# Patient Record
Sex: MALE | Race: WHITE | NOT HISPANIC OR LATINO
[De-identification: names, ages, dates, MRNs, and addresses within clinical notes are randomized per-mention and may not be internally consistent; named-entity substitution may affect disease eponyms.]

---

## 2022-07-15 ENCOUNTER — TRANSCRIPTION ENCOUNTER (OUTPATIENT)
Age: 67
End: 2022-07-15

## 2022-07-15 ENCOUNTER — INPATIENT (INPATIENT)
Facility: HOSPITAL | Age: 67
LOS: 5 days | Discharge: HOME | DRG: 326 | End: 2022-07-21
Attending: SURGERY | Admitting: SURGERY
Payer: MEDICAID

## 2022-07-15 VITALS
RESPIRATION RATE: 18 BRPM | HEART RATE: 103 BPM | WEIGHT: 190.04 LBS | TEMPERATURE: 97 F | SYSTOLIC BLOOD PRESSURE: 150 MMHG | DIASTOLIC BLOOD PRESSURE: 94 MMHG | HEIGHT: 75 IN | OXYGEN SATURATION: 97 %

## 2022-07-15 LAB
ALBUMIN SERPL ELPH-MCNC: 4.6 G/DL — SIGNIFICANT CHANGE UP (ref 3.3–5)
ALP SERPL-CCNC: 87 U/L — SIGNIFICANT CHANGE UP (ref 40–120)
ALT FLD-CCNC: 20 U/L — SIGNIFICANT CHANGE UP (ref 10–45)
ANION GAP SERPL CALC-SCNC: 15 MMOL/L — SIGNIFICANT CHANGE UP (ref 5–17)
AST SERPL-CCNC: 14 U/L — SIGNIFICANT CHANGE UP (ref 10–40)
BILIRUB SERPL-MCNC: 1.1 MG/DL — SIGNIFICANT CHANGE UP (ref 0.2–1.2)
BUN SERPL-MCNC: 16 MG/DL — SIGNIFICANT CHANGE UP (ref 7–23)
CALCIUM SERPL-MCNC: 9.1 MG/DL — SIGNIFICANT CHANGE UP (ref 8.4–10.5)
CHLORIDE SERPL-SCNC: 105 MMOL/L — SIGNIFICANT CHANGE UP (ref 96–108)
CO2 SERPL-SCNC: 21 MMOL/L — LOW (ref 22–31)
CREAT SERPL-MCNC: 1.02 MG/DL — SIGNIFICANT CHANGE UP (ref 0.5–1.3)
EGFR: 82 ML/MIN/1.73M2 — SIGNIFICANT CHANGE UP
GLUCOSE SERPL-MCNC: 177 MG/DL — HIGH (ref 70–99)
HCT VFR BLD CALC: 47.4 % — SIGNIFICANT CHANGE UP (ref 39–50)
HGB BLD-MCNC: 16.4 G/DL — SIGNIFICANT CHANGE UP (ref 13–17)
MAGNESIUM SERPL-MCNC: 1.9 MG/DL — SIGNIFICANT CHANGE UP (ref 1.6–2.6)
MCHC RBC-ENTMCNC: 32.9 PG — SIGNIFICANT CHANGE UP (ref 27–34)
MCHC RBC-ENTMCNC: 34.6 GM/DL — SIGNIFICANT CHANGE UP (ref 32–36)
MCV RBC AUTO: 95 FL — SIGNIFICANT CHANGE UP (ref 80–100)
PHOSPHATE SERPL-MCNC: 2.9 MG/DL — SIGNIFICANT CHANGE UP (ref 2.5–4.5)
PLATELET # BLD AUTO: 208 K/UL — SIGNIFICANT CHANGE UP (ref 150–400)
POTASSIUM SERPL-MCNC: 4 MMOL/L — SIGNIFICANT CHANGE UP (ref 3.5–5.3)
POTASSIUM SERPL-SCNC: 4 MMOL/L — SIGNIFICANT CHANGE UP (ref 3.5–5.3)
PROT SERPL-MCNC: 7.2 G/DL — SIGNIFICANT CHANGE UP (ref 6–8.3)
RBC # BLD: 4.99 M/UL — SIGNIFICANT CHANGE UP (ref 4.2–5.8)
RBC # FLD: 12.2 % — SIGNIFICANT CHANGE UP (ref 10.3–14.5)
SODIUM SERPL-SCNC: 141 MMOL/L — SIGNIFICANT CHANGE UP (ref 135–145)
WBC # BLD: 17.7 K/UL — HIGH (ref 3.8–10.5)
WBC # FLD AUTO: 17.7 K/UL — HIGH (ref 3.8–10.5)

## 2022-07-15 PROCEDURE — 93010 ELECTROCARDIOGRAM REPORT: CPT

## 2022-07-15 PROCEDURE — 71045 X-RAY EXAM CHEST 1 VIEW: CPT | Mod: 26

## 2022-07-15 PROCEDURE — 74177 CT ABD & PELVIS W/CONTRAST: CPT | Mod: 26,MA

## 2022-07-15 PROCEDURE — 99291 CRITICAL CARE FIRST HOUR: CPT

## 2022-07-15 RX ORDER — MORPHINE SULFATE 50 MG/1
4 CAPSULE, EXTENDED RELEASE ORAL ONCE
Refills: 0 | Status: DISCONTINUED | OUTPATIENT
Start: 2022-07-15 | End: 2022-07-15

## 2022-07-15 RX ORDER — PIPERACILLIN AND TAZOBACTAM 4; .5 G/20ML; G/20ML
3.38 INJECTION, POWDER, LYOPHILIZED, FOR SOLUTION INTRAVENOUS ONCE
Refills: 0 | Status: COMPLETED | OUTPATIENT
Start: 2022-07-15 | End: 2022-07-15

## 2022-07-15 RX ADMIN — MORPHINE SULFATE 4 MILLIGRAM(S): 50 CAPSULE, EXTENDED RELEASE ORAL at 22:57

## 2022-07-15 NOTE — ED PROVIDER NOTE - CARE PLAN
Principal Discharge DX:	Acute abdomen   1 Principal Discharge DX:	Acute abdomen  Secondary Diagnosis:	Perforated abdominal viscus

## 2022-07-15 NOTE — ED ADULT NURSE NOTE - OBJECTIVE STATEMENT
67 YO male with no stated PMH, via EMS from home, presenting with complaints of abdominal pain. As per patient, pt has been having abdominal pain for the last 6 months, which had worsened over the last few weeks. Pt states he has 7/10 abdominal pain, described as sharpness, which is worse to the right side and periumbilical region, which radiates to the shoulders, and worsens with movement, positional changes, deep breathing, and palpation. Pt states he took Tylenol with minimal relief. Pt denies chest pain, palpitations, shortness of breath, headache, visual disturbances, numbness/tingling, fever, chills, diaphoresis,  vomiting, constipation, diarrhea, or urinary symptoms. Pt reports last BM "yesterday" described as normal.

## 2022-07-15 NOTE — ED PROVIDER NOTE - OBJECTIVE STATEMENT
The patient is a 66y Male with 30+ pack per year smoker complaining of abdominal pain x 6 months which has worsened over the last two weeks. He describes the pain as a diffuse sharp pain which is predominantly worse in the RLQ and umbilical area. Pain radiates to the B/L scapula and is worse with movement, belching, and deep breaths. He has taken 2 Tylenol today with minimal relief. He denies any fevers, chills, nausea/vomiting, diarrhea, constipation, hematochezia, melena, SOB, CP, palpitations. Of note patient is a long time drinker and drinks as much as 2+ shots of alcohol per day. LBM today was normal. The patient is a 66y Male with 30+ pack per year smoker complaining of abdominal pain x 6 months which has worsened over the last two weeks. He describes the pain as a diffuse sharp pain which is predominantly worse in the lower abdoman but is now more generalized Pain radiates to the B/L scapula and is worse with movement, belching, and deep breaths. He has taken 2 Tylenol today with minimal relief. He denies any fevers, chills, nausea/vomiting, diarrhea, constipation, hematochezia, melena, SOB, CP, palpitations. Of note patient is a long time drinker and drinks as much as 2+ shots of alcohol per day. LBM today was normal. +tobacco.

## 2022-07-15 NOTE — ED PROVIDER NOTE - CLINICAL SUMMARY MEDICAL DECISION MAKING FREE TEXT BOX
The patient is a 66y Male with 30+ pack per year smoker complaining of abdominal pain x 6 months which has worsened over the last two weeks. Not currently with acute abdomen and hemodynamically stable but concerning presentation with periumbilical/RLQ tenderness. Differential includes appendicitis, cholecystitis, pancreatitis, GERD, gastritis. R/O AAA given history of smoking. Urgent CTAP w/IV contrast ordered. Labs pending. EKG pending and CXR sent. Morphine for pain. The patient is a 66y Male with 30+ pack per year smoker, daily drinker, does not see doctors, complaining of abdominal pain x 6 months which has worsened over the last two weeks. Hemodynamically stable which dec likelihood of vasc emergency, however +guarding, diffuse TTP, referred pain to BL shoulders increasing likelihood for perf viscus. Check upright CXR. Urgent CTAP w/IV contrast ordered. Labs, EKG pending, Morphine for pain. Likely surgical c/s once CXR obtained.

## 2022-07-16 DIAGNOSIS — R10.0 ACUTE ABDOMEN: ICD-10-CM

## 2022-07-16 LAB
ALBUMIN SERPL ELPH-MCNC: 3.7 G/DL — SIGNIFICANT CHANGE UP (ref 3.3–5)
ALP SERPL-CCNC: 61 U/L — SIGNIFICANT CHANGE UP (ref 40–120)
ALT FLD-CCNC: 18 U/L — SIGNIFICANT CHANGE UP (ref 10–45)
ANION GAP SERPL CALC-SCNC: 13 MMOL/L — SIGNIFICANT CHANGE UP (ref 5–17)
APTT BLD: 30.7 SEC — SIGNIFICANT CHANGE UP (ref 27.5–35.5)
AST SERPL-CCNC: 18 U/L — SIGNIFICANT CHANGE UP (ref 10–40)
BASE EXCESS BLDV CALC-SCNC: -3 MMOL/L — LOW (ref -2–2)
BASOPHILS # BLD AUTO: 0 K/UL — SIGNIFICANT CHANGE UP (ref 0–0.2)
BASOPHILS NFR BLD AUTO: 0 % — SIGNIFICANT CHANGE UP (ref 0–2)
BILIRUB DIRECT SERPL-MCNC: 0.3 MG/DL — SIGNIFICANT CHANGE UP (ref 0–0.3)
BILIRUB INDIRECT FLD-MCNC: 1.3 MG/DL — HIGH (ref 0.2–1)
BILIRUB SERPL-MCNC: 1.6 MG/DL — HIGH (ref 0.2–1.2)
BLD GP AB SCN SERPL QL: NEGATIVE — SIGNIFICANT CHANGE UP
BLOOD GAS VENOUS - CREATININE: SIGNIFICANT CHANGE UP MG/DL (ref 0.5–1.3)
BUN SERPL-MCNC: 14 MG/DL — SIGNIFICANT CHANGE UP (ref 7–23)
CA-I SERPL-SCNC: 1.11 MMOL/L — LOW (ref 1.15–1.33)
CALCIUM SERPL-MCNC: 8.8 MG/DL — SIGNIFICANT CHANGE UP (ref 8.4–10.5)
CHLORIDE BLDV-SCNC: 103 MMOL/L — SIGNIFICANT CHANGE UP (ref 96–108)
CHLORIDE SERPL-SCNC: 105 MMOL/L — SIGNIFICANT CHANGE UP (ref 96–108)
CO2 BLDV-SCNC: 24 MMOL/L — SIGNIFICANT CHANGE UP (ref 22–26)
CO2 SERPL-SCNC: 21 MMOL/L — LOW (ref 22–31)
CREAT SERPL-MCNC: 0.95 MG/DL — SIGNIFICANT CHANGE UP (ref 0.5–1.3)
EGFR: 89 ML/MIN/1.73M2 — SIGNIFICANT CHANGE UP
EOSINOPHIL # BLD AUTO: 0 K/UL — SIGNIFICANT CHANGE UP (ref 0–0.5)
EOSINOPHIL NFR BLD AUTO: 0 % — SIGNIFICANT CHANGE UP (ref 0–6)
GAS PNL BLDA: SIGNIFICANT CHANGE UP
GAS PNL BLDA: SIGNIFICANT CHANGE UP
GAS PNL BLDV: 136 MMOL/L — SIGNIFICANT CHANGE UP (ref 136–145)
GAS PNL BLDV: SIGNIFICANT CHANGE UP
GAS PNL BLDV: SIGNIFICANT CHANGE UP
GLUCOSE BLDV-MCNC: 172 MG/DL — HIGH (ref 70–99)
GLUCOSE SERPL-MCNC: 191 MG/DL — HIGH (ref 70–99)
HCO3 BLDV-SCNC: 23 MMOL/L — SIGNIFICANT CHANGE UP (ref 22–29)
HCT VFR BLD CALC: 43.1 % — SIGNIFICANT CHANGE UP (ref 39–50)
HCT VFR BLDA CALC: 47 % — SIGNIFICANT CHANGE UP (ref 39–51)
HGB BLD CALC-MCNC: 15.6 G/DL — SIGNIFICANT CHANGE UP (ref 12.6–17.4)
HGB BLD-MCNC: 14.8 G/DL — SIGNIFICANT CHANGE UP (ref 13–17)
INR BLD: 1.08 RATIO — SIGNIFICANT CHANGE UP (ref 0.88–1.16)
LACTATE BLDV-MCNC: 4 MMOL/L — CRITICAL HIGH (ref 0.7–2)
LYMPHOCYTES # BLD AUTO: 0.35 K/UL — LOW (ref 1–3.3)
LYMPHOCYTES # BLD AUTO: 2 % — LOW (ref 13–44)
MAGNESIUM SERPL-MCNC: 2 MG/DL — SIGNIFICANT CHANGE UP (ref 1.6–2.6)
MANUAL SMEAR VERIFICATION: SIGNIFICANT CHANGE UP
MCHC RBC-ENTMCNC: 32.7 PG — SIGNIFICANT CHANGE UP (ref 27–34)
MCHC RBC-ENTMCNC: 34.3 GM/DL — SIGNIFICANT CHANGE UP (ref 32–36)
MCV RBC AUTO: 95.4 FL — SIGNIFICANT CHANGE UP (ref 80–100)
METAMYELOCYTES # FLD: 1 % — HIGH (ref 0–0)
MONOCYTES # BLD AUTO: 0.18 K/UL — SIGNIFICANT CHANGE UP (ref 0–0.9)
MONOCYTES NFR BLD AUTO: 1 % — LOW (ref 2–14)
NEUTROPHILS # BLD AUTO: 16.99 K/UL — HIGH (ref 1.8–7.4)
NEUTROPHILS NFR BLD AUTO: 89.9 % — HIGH (ref 43–77)
NEUTS BAND # BLD: 6.1 % — SIGNIFICANT CHANGE UP (ref 0–8)
NRBC # BLD: 0 /100 WBCS — SIGNIFICANT CHANGE UP (ref 0–0)
PCO2 BLDV: 42 MMHG — SIGNIFICANT CHANGE UP (ref 42–55)
PH BLDV: 7.34 — SIGNIFICANT CHANGE UP (ref 7.32–7.43)
PHOSPHATE SERPL-MCNC: 3.6 MG/DL — SIGNIFICANT CHANGE UP (ref 2.5–4.5)
PLAT MORPH BLD: NORMAL — SIGNIFICANT CHANGE UP
PLATELET # BLD AUTO: 185 K/UL — SIGNIFICANT CHANGE UP (ref 150–400)
PO2 BLDV: 37 MMHG — SIGNIFICANT CHANGE UP (ref 25–45)
POTASSIUM BLDV-SCNC: 4.6 MMOL/L — SIGNIFICANT CHANGE UP (ref 3.5–5.1)
POTASSIUM SERPL-MCNC: 4.2 MMOL/L — SIGNIFICANT CHANGE UP (ref 3.5–5.3)
POTASSIUM SERPL-SCNC: 4.2 MMOL/L — SIGNIFICANT CHANGE UP (ref 3.5–5.3)
PROT SERPL-MCNC: 6 G/DL — SIGNIFICANT CHANGE UP (ref 6–8.3)
PROTHROM AB SERPL-ACNC: 12.5 SEC — SIGNIFICANT CHANGE UP (ref 10.5–13.4)
RBC # BLD: 4.52 M/UL — SIGNIFICANT CHANGE UP (ref 4.2–5.8)
RBC # FLD: 12.3 % — SIGNIFICANT CHANGE UP (ref 10.3–14.5)
RBC BLD AUTO: NORMAL — SIGNIFICANT CHANGE UP
RH IG SCN BLD-IMP: NEGATIVE — SIGNIFICANT CHANGE UP
SAO2 % BLDV: 58.7 % — LOW (ref 67–88)
SARS-COV-2 RNA SPEC QL NAA+PROBE: SIGNIFICANT CHANGE UP
SODIUM SERPL-SCNC: 139 MMOL/L — SIGNIFICANT CHANGE UP (ref 135–145)
WBC # BLD: 14.03 K/UL — HIGH (ref 3.8–10.5)
WBC # FLD AUTO: 14.03 K/UL — HIGH (ref 3.8–10.5)

## 2022-07-16 PROCEDURE — 43840 GSTRRPHY SUTR DUOL/GSTR ULCR: CPT

## 2022-07-16 PROCEDURE — 71045 X-RAY EXAM CHEST 1 VIEW: CPT | Mod: 26,77

## 2022-07-16 PROCEDURE — 71045 X-RAY EXAM CHEST 1 VIEW: CPT | Mod: 26

## 2022-07-16 PROCEDURE — 49905 OMENTAL FLAP INTRA-ABDOM: CPT

## 2022-07-16 PROCEDURE — 93010 ELECTROCARDIOGRAM REPORT: CPT

## 2022-07-16 PROCEDURE — 99222 1ST HOSP IP/OBS MODERATE 55: CPT | Mod: 57

## 2022-07-16 RX ORDER — ACETAMINOPHEN 500 MG
1000 TABLET ORAL ONCE
Refills: 0 | Status: COMPLETED | OUTPATIENT
Start: 2022-07-16 | End: 2022-07-16

## 2022-07-16 RX ORDER — PANTOPRAZOLE SODIUM 20 MG/1
40 TABLET, DELAYED RELEASE ORAL ONCE
Refills: 0 | Status: DISCONTINUED | OUTPATIENT
Start: 2022-07-16 | End: 2022-07-16

## 2022-07-16 RX ORDER — SODIUM CHLORIDE 9 MG/ML
1000 INJECTION, SOLUTION INTRAVENOUS
Refills: 0 | Status: DISCONTINUED | OUTPATIENT
Start: 2022-07-16 | End: 2022-07-16

## 2022-07-16 RX ORDER — NICOTINE POLACRILEX 2 MG
1 GUM BUCCAL DAILY
Refills: 0 | Status: DISCONTINUED | OUTPATIENT
Start: 2022-07-16 | End: 2022-07-21

## 2022-07-16 RX ORDER — PIPERACILLIN AND TAZOBACTAM 4; .5 G/20ML; G/20ML
3.38 INJECTION, POWDER, LYOPHILIZED, FOR SOLUTION INTRAVENOUS EVERY 8 HOURS
Refills: 0 | Status: COMPLETED | OUTPATIENT
Start: 2022-07-16 | End: 2022-07-19

## 2022-07-16 RX ORDER — FLUCONAZOLE 150 MG/1
400 TABLET ORAL EVERY 24 HOURS
Refills: 0 | Status: DISCONTINUED | OUTPATIENT
Start: 2022-07-16 | End: 2022-07-17

## 2022-07-16 RX ORDER — DIAZEPAM 5 MG
20 TABLET ORAL
Refills: 0 | Status: DISCONTINUED | OUTPATIENT
Start: 2022-07-16 | End: 2022-07-21

## 2022-07-16 RX ORDER — FENTANYL CITRATE 50 UG/ML
25 INJECTION INTRAVENOUS
Refills: 0 | Status: DISCONTINUED | OUTPATIENT
Start: 2022-07-16 | End: 2022-07-16

## 2022-07-16 RX ORDER — SODIUM CHLORIDE 9 MG/ML
1000 INJECTION, SOLUTION INTRAVENOUS
Refills: 0 | Status: DISCONTINUED | OUTPATIENT
Start: 2022-07-16 | End: 2022-07-17

## 2022-07-16 RX ORDER — MORPHINE SULFATE 50 MG/1
4 CAPSULE, EXTENDED RELEASE ORAL ONCE
Refills: 0 | Status: DISCONTINUED | OUTPATIENT
Start: 2022-07-16 | End: 2022-07-16

## 2022-07-16 RX ORDER — SODIUM CHLORIDE 9 MG/ML
1000 INJECTION INTRAMUSCULAR; INTRAVENOUS; SUBCUTANEOUS ONCE
Refills: 0 | Status: COMPLETED | OUTPATIENT
Start: 2022-07-16 | End: 2022-07-16

## 2022-07-16 RX ORDER — PANTOPRAZOLE SODIUM 20 MG/1
40 TABLET, DELAYED RELEASE ORAL EVERY 12 HOURS
Refills: 0 | Status: DISCONTINUED | OUTPATIENT
Start: 2022-07-16 | End: 2022-07-16

## 2022-07-16 RX ORDER — NICOTINE POLACRILEX 2 MG
1 GUM BUCCAL DAILY
Refills: 0 | Status: DISCONTINUED | OUTPATIENT
Start: 2022-07-16 | End: 2022-07-16

## 2022-07-16 RX ORDER — KETOROLAC TROMETHAMINE 30 MG/ML
15 SYRINGE (ML) INJECTION ONCE
Refills: 0 | Status: DISCONTINUED | OUTPATIENT
Start: 2022-07-16 | End: 2022-07-16

## 2022-07-16 RX ORDER — ENOXAPARIN SODIUM 100 MG/ML
40 INJECTION SUBCUTANEOUS EVERY 24 HOURS
Refills: 0 | Status: DISCONTINUED | OUTPATIENT
Start: 2022-07-16 | End: 2022-07-16

## 2022-07-16 RX ORDER — FENTANYL CITRATE 50 UG/ML
50 INJECTION INTRAVENOUS ONCE
Refills: 0 | Status: DISCONTINUED | OUTPATIENT
Start: 2022-07-16 | End: 2022-07-16

## 2022-07-16 RX ORDER — ENOXAPARIN SODIUM 100 MG/ML
40 INJECTION SUBCUTANEOUS EVERY 24 HOURS
Refills: 0 | Status: DISCONTINUED | OUTPATIENT
Start: 2022-07-16 | End: 2022-07-21

## 2022-07-16 RX ADMIN — Medication 400 MILLIGRAM(S): at 12:44

## 2022-07-16 RX ADMIN — SODIUM CHLORIDE 150 MILLILITER(S): 9 INJECTION, SOLUTION INTRAVENOUS at 21:50

## 2022-07-16 RX ADMIN — PIPERACILLIN AND TAZOBACTAM 25 GRAM(S): 4; .5 INJECTION, POWDER, LYOPHILIZED, FOR SOLUTION INTRAVENOUS at 12:44

## 2022-07-16 RX ADMIN — PIPERACILLIN AND TAZOBACTAM 200 GRAM(S): 4; .5 INJECTION, POWDER, LYOPHILIZED, FOR SOLUTION INTRAVENOUS at 00:46

## 2022-07-16 RX ADMIN — FLUCONAZOLE 100 MILLIGRAM(S): 150 TABLET ORAL at 05:58

## 2022-07-16 RX ADMIN — MORPHINE SULFATE 4 MILLIGRAM(S): 50 CAPSULE, EXTENDED RELEASE ORAL at 00:45

## 2022-07-16 RX ADMIN — PIPERACILLIN AND TAZOBACTAM 25 GRAM(S): 4; .5 INJECTION, POWDER, LYOPHILIZED, FOR SOLUTION INTRAVENOUS at 05:39

## 2022-07-16 RX ADMIN — Medication 15 MILLIGRAM(S): at 18:43

## 2022-07-16 RX ADMIN — Medication 15 MILLIGRAM(S): at 18:47

## 2022-07-16 RX ADMIN — ENOXAPARIN SODIUM 40 MILLIGRAM(S): 100 INJECTION SUBCUTANEOUS at 05:40

## 2022-07-16 RX ADMIN — Medication 1000 MILLIGRAM(S): at 17:56

## 2022-07-16 RX ADMIN — SODIUM CHLORIDE 150 MILLILITER(S): 9 INJECTION, SOLUTION INTRAVENOUS at 05:39

## 2022-07-16 RX ADMIN — PIPERACILLIN AND TAZOBACTAM 25 GRAM(S): 4; .5 INJECTION, POWDER, LYOPHILIZED, FOR SOLUTION INTRAVENOUS at 21:50

## 2022-07-16 NOTE — BRIEF OPERATIVE NOTE - NSICDXBRIEFPROCEDURE_GEN_ALL_CORE_FT
PROCEDURES:  Exploratory laparotomy 16-Jul-2022 04:41:02  Rodriguez CORRIGAN  Repair of perforated pyloric ulcer using Chilo patch 16-Jul-2022 04:41:13  Rodriguez CORRIGAN

## 2022-07-16 NOTE — PROGRESS NOTE ADULT - SUBJECTIVE AND OBJECTIVE BOX
GENERAL SURGERY PROGRESS NOTE    doing well, had NGT coiled in mouth this morning, reinserted  no bowel function  pain well controlled    10-point review of systems completed and negative except as noted above.      OBJECTIVE    MEDICATIONS  enoxaparin Injectable 40 milliGRAM(s) SubCutaneous every 24 hours  fluconAZOLE IVPB 400 milliGRAM(s) IV Intermittent every 24 hours  lactated ringers. 1000 milliLiter(s) IV Continuous <Continuous>  LORazepam   Injectable 2 milliGRAM(s) IV Push every 1 hour PRN  nicotine -  14 mG/24Hr(s) Patch 1 Patch Transdermal daily  piperacillin/tazobactam IVPB.. 3.375 Gram(s) IV Intermittent every 8 hours      PHYSICAL EXAM  T(C): 37.1 (07-16-22 @ 14:52), Max: 37.4 (07-16-22 @ 11:20)  HR: 67 (07-16-22 @ 14:52) (66 - 103)  BP: 95/59 (07-16-22 @ 14:52) (93/61 - 151/91)  RR: 18 (07-16-22 @ 14:52) (12 - 20)  SpO2: 96% (07-16-22 @ 14:52) (94% - 100%)    07-15-22 @ 07:01  -  07-16-22 @ 07:00  --------------------------------------------------------  IN: 675 mL / OUT: 425 mL / NET: 250 mL    07-16-22 @ 07:01  -  07-16-22 @ 14:53  --------------------------------------------------------  IN: 450 mL / OUT: 230 mL / NET: 220 mL        General: Appears well, NAD  Neuro: AAOx3  CHEST: Clear to auscultation bilaterally  CV: Regular rate and rhythm  Abdomen: soft, incisional tenderness, nondistended, no rebound or guarding, ARIADNA serosang  Extremities: Grossly symmetric    LABS                        14.8   14.03 )-----------( 185      ( 16 Jul 2022 05:59 )             43.1     07-16    139  |  105  |  14  ----------------------------<  191<H>  4.2   |  21<L>  |  0.95    Ca    8.8      16 Jul 2022 05:58  Phos  3.6     07-16  Mg     2.0     07-16    TPro  6.0  /  Alb  3.7  /  TBili  1.6<H>  /  DBili  0.3  /  AST  18  /  ALT  18  /  AlkPhos  61  07-16    PT/INR - ( 16 Jul 2022 01:15 )   PT: 12.5 sec;   INR: 1.08 ratio         PTT - ( 16 Jul 2022 01:15 )  PTT:30.7 sec      RADIOLOGY & ADDITIONAL STUDIES

## 2022-07-16 NOTE — H&P ADULT - NSHPLABSRESULTS_GEN_ALL_CORE
T(C): 37.3 (07-15-22 @ 22:55), Max: 37.3 (07-15-22 @ 22:55)  HR: 90 (07-15-22 @ 22:55) (90 - 103)  BP: 139/94 (07-15-22 @ 22:55) (139/94 - 150/94)  RR: 20 (07-15-22 @ 22:55) (18 - 20)  SpO2: 97% (07-15-22 @ 22:55) (97% - 97%)                              16.4   17.70 )-----------( 208      ( 15 Jul 2022 23:20 )             47.4   07-15    141  |  105  |  16  ----------------------------<  177<H>  4.0   |  21<L>  |  1.02    Ca    9.1      15 Jul 2022 23:20  Phos  2.9     07-15  Mg     1.9     07-15    TPro  7.2  /  Alb  4.6  /  TBili  1.1  /  DBili  x   /  AST  14  /  ALT  20  /  AlkPhos  87  07-15      CT: moderate free air.

## 2022-07-16 NOTE — ED ADULT NURSE REASSESSMENT NOTE - NS ED NURSE REASSESS COMMENT FT1
Pt to go to OR. Report called. Pt clothing removed and placed in OR cabinet. Pt's valuables placed in valuables envelope [id number].

## 2022-07-16 NOTE — PATIENT PROFILE ADULT - NSPROEXTENSIONSOFSELF_GEN_A_NUR
----- Message from [facility], RT sent at 1/6/2020 10:41 AM CST -----  Regarding: Lab due  Luis Barboza MD on 1/4/2020 at 3:38 PM CST  Call patient.  Blood sugar was 180, A1c was fine at 6.6, kidney function are normal liver enzymes are slightly elevated, cholesterol looks good at 181 triglycerides slightly elevated  256, is time to reduce the carbs in bread in your diet.  Reduce potatoes rice and pasta.  Recheck A1c and CMP in 3 months.   none

## 2022-07-16 NOTE — H&P ADULT - HISTORY OF PRESENT ILLNESS
66 year male with history of daily ETOH use who presents with one day of acute severe abdominal pain. Patient reports he was eating this afternoon when he had severe pain associated with nausea. Denies fever, chills, vomiting. Have not seen a doctor in a long time. Report drinks 3 shots of vodka every night.

## 2022-07-16 NOTE — PATIENT PROFILE ADULT - FALL HARM RISK - HARM RISK INTERVENTIONS
Assistance with ambulation/Assistance OOB with selected safe patient handling equipment/Communicate Risk of Fall with Harm to all staff/Monitor for mental status changes/Monitor gait and stability/Reinforce activity limits and safety measures with patient and family/Tailored Fall Risk Interventions/Toileting schedule using arm’s reach rule for commode and bathroom/Use of alarms - bed, chair and/or voice tab/Visual Cue: Yellow wristband and red socks/Bed in lowest position, wheels locked, appropriate side rails in place/Call bell, personal items and telephone in reach/Instruct patient to call for assistance before getting out of bed or chair/Non-slip footwear when patient is out of bed/[place] to call system/Physically safe environment - no spills, clutter or unnecessary equipment/Purposeful Proactive Rounding/Room/bathroom lighting operational, light cord in reach

## 2022-07-16 NOTE — H&P ADULT - NSHPSOCIALHISTORY_GEN_ALL_CORE
smokes 3 ppd, and 3 vodka shots  lives with his disabled friend who would be his next of kin should he need decisions made on his behalf.

## 2022-07-16 NOTE — PROGRESS NOTE ADULT - ASSESSMENT
66 year male with history of daily ETOH use who presents with peritonitis and found to have moderate amount of intraabdominal free air concern for perforated viscus. s/p open ayanna patch repair of pre-pyloric perforated ulcer     Plan  NPO NGT to lws  IFVFs, pain control  H. pylori testing  IV abx  ARIADNA to bulb suction  f/u cxr s/p NGT repositioning

## 2022-07-16 NOTE — H&P ADULT - NSHPPHYSICALEXAM_GEN_ALL_CORE
General: well developed, well nourished, In distress. Diaphoretic.   Neuro: alert and oriented, no focal deficits, moves all extremities spontaneously  HEENT: NCAT, no lymphadenopathy  Respiratory: airway patent, respirations unlabored  CVS: regular rate and rhythm  Abdomen: Distended, diffuse tenderness with guarding.   Extremities: no edema, sensation and movement grossly intact  Skin: warm, dry, appropriate color

## 2022-07-16 NOTE — PRE-ANESTHESIA EVALUATION ADULT - NSANTHPMHFT_GEN_ALL_CORE
66 year male reports no PMHx, smoker, daily ETOH presented  peritonitis and found to have moderate amount of intraabdominal free air concern for perforated viscus.  METS>4 Endorses CP months ago, occasional SOB.

## 2022-07-16 NOTE — H&P ADULT - ATTENDING COMMENTS
Patient seen and examined preoperatively  Chart, labs, and imaging reviewed  Signs / symptoms / imaging consistent with acute perforated viscous  Risks / benefits / alternatives to immediate OR for exploratory laparotomy discussed with patient & consent signed

## 2022-07-16 NOTE — H&P ADULT - ASSESSMENT
66 year male with history of daily ETOH use who presents with peritonitis and found to have moderate amount of intraabdominal free air concern for perforated viscus.     Plan  - Admit to surgery under Dr. Pa  - Consented and booked emergently for the OR  - NPO/IVF  - CIWA for history of Etoh and current diaphoresis  - Pain control  - Lovenox for dvt ppx    Discussed with Attending Dr. Ember Aragon MD, PGY 4  Trauma and Acute Care Surgery  [id number]

## 2022-07-16 NOTE — PATIENT PROFILE ADULT - NSPROPTRIGHTSUPPORTPERSON_GEN_A_NUR
declines Consent: The patient's consent was obtained including but not limited to risks of crusting, scabbing, blistering, scarring, darker or lighter pigmentary change, recurrence, incomplete removal and infection. Number Of Freeze-Thaw Cycles: 3 freeze-thaw cycles Duration Of Freeze Thaw-Cycle (Seconds): 10 Post-Care Instructions: I reviewed with the patient in detail post-care instructions. Patient is to wear sunprotection, and avoid picking at any of the treated lesions. Pt may apply Vaseline to crusted or scabbing areas. Detail Level: Detailed Render Post-Care Instructions In Note?: yes Application Tool (Optional): Liquid Nitrogen Sprayer Render Note In Bullet Format When Appropriate: No

## 2022-07-17 LAB
ALBUMIN SERPL ELPH-MCNC: 3.2 G/DL — LOW (ref 3.3–5)
ALP SERPL-CCNC: 60 U/L — SIGNIFICANT CHANGE UP (ref 40–120)
ALT FLD-CCNC: 13 U/L — SIGNIFICANT CHANGE UP (ref 10–45)
ANION GAP SERPL CALC-SCNC: 11 MMOL/L — SIGNIFICANT CHANGE UP (ref 5–17)
AST SERPL-CCNC: 17 U/L — SIGNIFICANT CHANGE UP (ref 10–40)
BILIRUB SERPL-MCNC: 1.5 MG/DL — HIGH (ref 0.2–1.2)
BUN SERPL-MCNC: 14 MG/DL — SIGNIFICANT CHANGE UP (ref 7–23)
CALCIUM SERPL-MCNC: 8.4 MG/DL — SIGNIFICANT CHANGE UP (ref 8.4–10.5)
CHLORIDE SERPL-SCNC: 106 MMOL/L — SIGNIFICANT CHANGE UP (ref 96–108)
CO2 SERPL-SCNC: 24 MMOL/L — SIGNIFICANT CHANGE UP (ref 22–31)
CREAT SERPL-MCNC: 1.26 MG/DL — SIGNIFICANT CHANGE UP (ref 0.5–1.3)
EGFR: 64 ML/MIN/1.73M2 — SIGNIFICANT CHANGE UP
GLUCOSE SERPL-MCNC: 116 MG/DL — HIGH (ref 70–99)
HCT VFR BLD CALC: 39 % — SIGNIFICANT CHANGE UP (ref 39–50)
HGB BLD-MCNC: 13 G/DL — SIGNIFICANT CHANGE UP (ref 13–17)
MAGNESIUM SERPL-MCNC: 1.9 MG/DL — SIGNIFICANT CHANGE UP (ref 1.6–2.6)
MCHC RBC-ENTMCNC: 32.2 PG — SIGNIFICANT CHANGE UP (ref 27–34)
MCHC RBC-ENTMCNC: 33.3 GM/DL — SIGNIFICANT CHANGE UP (ref 32–36)
MCV RBC AUTO: 96.5 FL — SIGNIFICANT CHANGE UP (ref 80–100)
NRBC # BLD: 0 /100 WBCS — SIGNIFICANT CHANGE UP (ref 0–0)
PHOSPHATE SERPL-MCNC: 2 MG/DL — LOW (ref 2.5–4.5)
PLATELET # BLD AUTO: 168 K/UL — SIGNIFICANT CHANGE UP (ref 150–400)
POTASSIUM SERPL-MCNC: 3.7 MMOL/L — SIGNIFICANT CHANGE UP (ref 3.5–5.3)
POTASSIUM SERPL-SCNC: 3.7 MMOL/L — SIGNIFICANT CHANGE UP (ref 3.5–5.3)
PROT SERPL-MCNC: 5.9 G/DL — LOW (ref 6–8.3)
RBC # BLD: 4.04 M/UL — LOW (ref 4.2–5.8)
RBC # FLD: 12.9 % — SIGNIFICANT CHANGE UP (ref 10.3–14.5)
SODIUM SERPL-SCNC: 141 MMOL/L — SIGNIFICANT CHANGE UP (ref 135–145)
WBC # BLD: 11.57 K/UL — HIGH (ref 3.8–10.5)
WBC # FLD AUTO: 11.57 K/UL — HIGH (ref 3.8–10.5)

## 2022-07-17 RX ORDER — ACETAMINOPHEN 500 MG
975 TABLET ORAL EVERY 6 HOURS
Refills: 0 | Status: DISCONTINUED | OUTPATIENT
Start: 2022-07-17 | End: 2022-07-17

## 2022-07-17 RX ORDER — DEXTROSE MONOHYDRATE, SODIUM CHLORIDE, AND POTASSIUM CHLORIDE 50; .745; 4.5 G/1000ML; G/1000ML; G/1000ML
1000 INJECTION, SOLUTION INTRAVENOUS
Refills: 0 | Status: DISCONTINUED | OUTPATIENT
Start: 2022-07-17 | End: 2022-07-19

## 2022-07-17 RX ORDER — HYDROMORPHONE HYDROCHLORIDE 2 MG/ML
0.5 INJECTION INTRAMUSCULAR; INTRAVENOUS; SUBCUTANEOUS EVERY 6 HOURS
Refills: 0 | Status: DISCONTINUED | OUTPATIENT
Start: 2022-07-17 | End: 2022-07-20

## 2022-07-17 RX ORDER — OXYCODONE HYDROCHLORIDE 5 MG/1
2.5 TABLET ORAL EVERY 4 HOURS
Refills: 0 | Status: DISCONTINUED | OUTPATIENT
Start: 2022-07-17 | End: 2022-07-17

## 2022-07-17 RX ORDER — OXYCODONE HYDROCHLORIDE 5 MG/1
5 TABLET ORAL EVERY 4 HOURS
Refills: 0 | Status: DISCONTINUED | OUTPATIENT
Start: 2022-07-17 | End: 2022-07-17

## 2022-07-17 RX ORDER — KETOROLAC TROMETHAMINE 30 MG/ML
15 SYRINGE (ML) INJECTION ONCE
Refills: 0 | Status: DISCONTINUED | OUTPATIENT
Start: 2022-07-17 | End: 2022-07-17

## 2022-07-17 RX ORDER — ACETAMINOPHEN 500 MG
1000 TABLET ORAL EVERY 6 HOURS
Refills: 0 | Status: COMPLETED | OUTPATIENT
Start: 2022-07-17 | End: 2022-07-18

## 2022-07-17 RX ADMIN — ENOXAPARIN SODIUM 40 MILLIGRAM(S): 100 INJECTION SUBCUTANEOUS at 04:54

## 2022-07-17 RX ADMIN — PIPERACILLIN AND TAZOBACTAM 25 GRAM(S): 4; .5 INJECTION, POWDER, LYOPHILIZED, FOR SOLUTION INTRAVENOUS at 06:45

## 2022-07-17 RX ADMIN — PIPERACILLIN AND TAZOBACTAM 25 GRAM(S): 4; .5 INJECTION, POWDER, LYOPHILIZED, FOR SOLUTION INTRAVENOUS at 20:36

## 2022-07-17 RX ADMIN — Medication 400 MILLIGRAM(S): at 18:26

## 2022-07-17 RX ADMIN — HYDROMORPHONE HYDROCHLORIDE 0.5 MILLIGRAM(S): 2 INJECTION INTRAMUSCULAR; INTRAVENOUS; SUBCUTANEOUS at 22:45

## 2022-07-17 RX ADMIN — FLUCONAZOLE 100 MILLIGRAM(S): 150 TABLET ORAL at 05:13

## 2022-07-17 RX ADMIN — DEXTROSE MONOHYDRATE, SODIUM CHLORIDE, AND POTASSIUM CHLORIDE 90 MILLILITER(S): 50; .745; 4.5 INJECTION, SOLUTION INTRAVENOUS at 20:36

## 2022-07-17 RX ADMIN — Medication 15 MILLIGRAM(S): at 04:59

## 2022-07-17 RX ADMIN — HYDROMORPHONE HYDROCHLORIDE 0.5 MILLIGRAM(S): 2 INJECTION INTRAMUSCULAR; INTRAVENOUS; SUBCUTANEOUS at 23:02

## 2022-07-17 RX ADMIN — Medication 1000 MILLIGRAM(S): at 19:03

## 2022-07-17 RX ADMIN — DEXTROSE MONOHYDRATE, SODIUM CHLORIDE, AND POTASSIUM CHLORIDE 90 MILLILITER(S): 50; .745; 4.5 INJECTION, SOLUTION INTRAVENOUS at 18:26

## 2022-07-17 RX ADMIN — Medication 15 MILLIGRAM(S): at 04:54

## 2022-07-17 RX ADMIN — PIPERACILLIN AND TAZOBACTAM 25 GRAM(S): 4; .5 INJECTION, POWDER, LYOPHILIZED, FOR SOLUTION INTRAVENOUS at 12:57

## 2022-07-17 NOTE — PROGRESS NOTE ADULT - SUBJECTIVE AND OBJECTIVE BOX
GENERAL SURGERY PROGRESS NOTE    doing well, had NGT coiled in mouth this morning, reinserted  no bowel function  pain well controlled    10-point review of systems completed and negative except as noted above.      OBJECTIVE    MEDICATIONS  enoxaparin Injectable 40 milliGRAM(s) SubCutaneous every 24 hours  fluconAZOLE IVPB 400 milliGRAM(s) IV Intermittent every 24 hours  lactated ringers. 1000 milliLiter(s) IV Continuous <Continuous>  LORazepam   Injectable 2 milliGRAM(s) IV Push every 1 hour PRN  nicotine -  14 mG/24Hr(s) Patch 1 Patch Transdermal daily  piperacillin/tazobactam IVPB.. 3.375 Gram(s) IV Intermittent every 8 hours      PHYSICAL EXAM  T(C): 37.1 (07-16-22 @ 14:52), Max: 37.4 (07-16-22 @ 11:20)  HR: 67 (07-16-22 @ 14:52) (66 - 103)  BP: 95/59 (07-16-22 @ 14:52) (93/61 - 151/91)  RR: 18 (07-16-22 @ 14:52) (12 - 20)  SpO2: 96% (07-16-22 @ 14:52) (94% - 100%)    07-15-22 @ 07:01  -  07-16-22 @ 07:00  --------------------------------------------------------  IN: 675 mL / OUT: 425 mL / NET: 250 mL    07-16-22 @ 07:01  -  07-16-22 @ 14:53  --------------------------------------------------------  IN: 450 mL / OUT: 230 mL / NET: 220 mL      General: Appears well, NAD  Neuro: AAOx3  CHEST: Clear to auscultation bilaterally  CV: Regular rate and rhythm  Abdomen: soft, incisional tenderness, nondistended, no rebound or guarding, ARIADNA serosang  Extremities: Grossly symmetric    LABS                        14.8   14.03 )-----------( 185      ( 16 Jul 2022 05:59 )             43.1     07-16    139  |  105  |  14  ----------------------------<  191<H>  4.2   |  21<L>  |  0.95    Ca    8.8      16 Jul 2022 05:58  Phos  3.6     07-16  Mg     2.0     07-16    TPro  6.0  /  Alb  3.7  /  TBili  1.6<H>  /  DBili  0.3  /  AST  18  /  ALT  18  /  AlkPhos  61  07-16    PT/INR - ( 16 Jul 2022 01:15 )   PT: 12.5 sec;   INR: 1.08 ratio         PTT - ( 16 Jul 2022 01:15 )  PTT:30.7 sec      RADIOLOGY & ADDITIONAL STUDIES GENERAL SURGERY PROGRESS NOTE    doing well, passing gas, no BM  jamie 85cc serous  NGT 600cc gastric contents  no fevers, pain well controlled    10-point review of systems completed and negative except as noted above.      OBJECTIVE    MEDICATIONS  dextrose 5% + sodium chloride 0.45% with potassium chloride 20 mEq/L 1000 milliLiter(s) IV Continuous <Continuous>  diazepam  Injectable 20 milliGRAM(s) IV Push <User Schedule> PRN  enoxaparin Injectable 40 milliGRAM(s) SubCutaneous every 24 hours  nicotine -  14 mG/24Hr(s) Patch 1 Patch Transdermal daily  oxyCODONE    IR 5 milliGRAM(s) Oral every 4 hours PRN  oxyCODONE    IR 2.5 milliGRAM(s) Oral every 4 hours PRN  piperacillin/tazobactam IVPB.. 3.375 Gram(s) IV Intermittent every 8 hours      PHYSICAL EXAM  T(C): 36.9 (07-17-22 @ 08:28), Max: 37.2 (07-16-22 @ 16:30)  HR: 73 (07-17-22 @ 08:28) (65 - 76)  BP: 112/71 (07-17-22 @ 08:28) (93/61 - 112/71)  RR: 18 (07-17-22 @ 08:28) (18 - 18)  SpO2: 92% (07-17-22 @ 08:28) (92% - 96%)    07-16-22 @ 07:01  -  07-17-22 @ 07:00  --------------------------------------------------------  IN: 2650 mL / OUT: 2055 mL / NET: 595 mL    07-17-22 @ 07:01  -  07-17-22 @ 11:21  --------------------------------------------------------  IN: 0 mL / OUT: 0 mL / NET: 0 mL        General: Appears well, NAD  Neuro: AAOx3  CHEST: Clear to auscultation bilaterally  CV: Regular rate and rhythm  Abdomen: soft, incisional tenderness, nondistended, no rebound or guarding, JAMIE serosang  Extremities: Grossly symmetric    LABS                        13.0   11.57 )-----------( 168      ( 17 Jul 2022 07:45 )             39.0     07-17    141  |  106  |  14  ----------------------------<  116<H>  3.7   |  24  |  1.26    Ca    8.4      17 Jul 2022 07:41  Phos  2.0     07-17  Mg     1.9     07-17    TPro  5.9<L>  /  Alb  3.2<L>  /  TBili  1.5<H>  /  DBili  x   /  AST  17  /  ALT  13  /  AlkPhos  60  07-17    PT/INR - ( 16 Jul 2022 01:15 )   PT: 12.5 sec;   INR: 1.08 ratio         PTT - ( 16 Jul 2022 01:15 )  PTT:30.7 sec      RADIOLOGY & ADDITIONAL STUDIES

## 2022-07-17 NOTE — PROVIDER CONTACT NOTE (OTHER) - ASSESSMENT
pt refusing 1st OOB due to pain. pt willing to try tommorrow AM. Also pt CIWA score is 0 and order for low risk was needed

## 2022-07-17 NOTE — PROGRESS NOTE ADULT - ASSESSMENT
66 year male with history of daily ETOH use who presents with peritonitis and found to have moderate amount of intraabdominal free air concern for perforated viscus. s/p open ayanna patch repair of pre-pyloric perforated ulcer     Plan  NPO NGT to lws  IFVFs, pain control  H. pylori testing  IV abx  ARIADNA to bulb suction  f/u cxr s/p NGT repositioning     66 year male with history of daily ETOH use who presents with peritonitis and found to have moderate amount of intraabdominal free air concern for perforated viscus. s/p open ayanna patch repair of pre-pyloric perforated ulcer     Plan  NPO NGT to lws  IFVFs, pain control  H. pylori testing-- Pending collection  IV abx  ARIADNA to bulb suction     66 year male with history of daily ETOH use who presents with peritonitis and found to have moderate amount of intraabdominal free air concern for perforated viscus. s/p open ayanna patch repair of pre-pyloric perforated ulcer POD1    Plan  NPO, cont NGT until has consistent bowel function  cont jamie to bulb suction  discontinue fluconazole continue zosyn day 2 of 4  pain control  f/y H. pylori testing

## 2022-07-18 LAB
ALBUMIN SERPL ELPH-MCNC: 3.2 G/DL — LOW (ref 3.3–5)
ALP SERPL-CCNC: 62 U/L — SIGNIFICANT CHANGE UP (ref 40–120)
ALT FLD-CCNC: 12 U/L — SIGNIFICANT CHANGE UP (ref 10–45)
ANION GAP SERPL CALC-SCNC: 11 MMOL/L — SIGNIFICANT CHANGE UP (ref 5–17)
AST SERPL-CCNC: 15 U/L — SIGNIFICANT CHANGE UP (ref 10–40)
BILIRUB SERPL-MCNC: 1 MG/DL — SIGNIFICANT CHANGE UP (ref 0.2–1.2)
BUN SERPL-MCNC: 12 MG/DL — SIGNIFICANT CHANGE UP (ref 7–23)
CALCIUM SERPL-MCNC: 8.2 MG/DL — LOW (ref 8.4–10.5)
CHLORIDE SERPL-SCNC: 104 MMOL/L — SIGNIFICANT CHANGE UP (ref 96–108)
CO2 SERPL-SCNC: 24 MMOL/L — SIGNIFICANT CHANGE UP (ref 22–31)
CREAT SERPL-MCNC: 1.13 MG/DL — SIGNIFICANT CHANGE UP (ref 0.5–1.3)
EGFR: 73 ML/MIN/1.73M2 — SIGNIFICANT CHANGE UP
GLUCOSE SERPL-MCNC: 117 MG/DL — HIGH (ref 70–99)
HCT VFR BLD CALC: 38.7 % — LOW (ref 39–50)
HGB BLD-MCNC: 12.9 G/DL — LOW (ref 13–17)
MAGNESIUM SERPL-MCNC: 2.1 MG/DL — SIGNIFICANT CHANGE UP (ref 1.6–2.6)
MCHC RBC-ENTMCNC: 32.3 PG — SIGNIFICANT CHANGE UP (ref 27–34)
MCHC RBC-ENTMCNC: 33.3 GM/DL — SIGNIFICANT CHANGE UP (ref 32–36)
MCV RBC AUTO: 96.8 FL — SIGNIFICANT CHANGE UP (ref 80–100)
NRBC # BLD: 0 /100 WBCS — SIGNIFICANT CHANGE UP (ref 0–0)
PHOSPHATE SERPL-MCNC: 1.7 MG/DL — LOW (ref 2.5–4.5)
PLATELET # BLD AUTO: 167 K/UL — SIGNIFICANT CHANGE UP (ref 150–400)
POTASSIUM SERPL-MCNC: 3.7 MMOL/L — SIGNIFICANT CHANGE UP (ref 3.5–5.3)
POTASSIUM SERPL-SCNC: 3.7 MMOL/L — SIGNIFICANT CHANGE UP (ref 3.5–5.3)
PROT SERPL-MCNC: 6.1 G/DL — SIGNIFICANT CHANGE UP (ref 6–8.3)
RBC # BLD: 4 M/UL — LOW (ref 4.2–5.8)
RBC # FLD: 12.5 % — SIGNIFICANT CHANGE UP (ref 10.3–14.5)
SODIUM SERPL-SCNC: 139 MMOL/L — SIGNIFICANT CHANGE UP (ref 135–145)
WBC # BLD: 9.55 K/UL — SIGNIFICANT CHANGE UP (ref 3.8–10.5)
WBC # FLD AUTO: 9.55 K/UL — SIGNIFICANT CHANGE UP (ref 3.8–10.5)

## 2022-07-18 RX ORDER — PANTOPRAZOLE SODIUM 20 MG/1
40 TABLET, DELAYED RELEASE ORAL DAILY
Refills: 0 | Status: DISCONTINUED | OUTPATIENT
Start: 2022-07-18 | End: 2022-07-20

## 2022-07-18 RX ORDER — POTASSIUM CHLORIDE 20 MEQ
10 PACKET (EA) ORAL
Refills: 0 | Status: COMPLETED | OUTPATIENT
Start: 2022-07-18 | End: 2022-07-18

## 2022-07-18 RX ORDER — ACETAMINOPHEN 500 MG
1000 TABLET ORAL EVERY 6 HOURS
Refills: 0 | Status: COMPLETED | OUTPATIENT
Start: 2022-07-18 | End: 2022-07-19

## 2022-07-18 RX ADMIN — Medication 100 MILLIEQUIVALENT(S): at 12:01

## 2022-07-18 RX ADMIN — PIPERACILLIN AND TAZOBACTAM 25 GRAM(S): 4; .5 INJECTION, POWDER, LYOPHILIZED, FOR SOLUTION INTRAVENOUS at 14:14

## 2022-07-18 RX ADMIN — PIPERACILLIN AND TAZOBACTAM 25 GRAM(S): 4; .5 INJECTION, POWDER, LYOPHILIZED, FOR SOLUTION INTRAVENOUS at 06:12

## 2022-07-18 RX ADMIN — ENOXAPARIN SODIUM 40 MILLIGRAM(S): 100 INJECTION SUBCUTANEOUS at 06:12

## 2022-07-18 RX ADMIN — Medication 1000 MILLIGRAM(S): at 12:31

## 2022-07-18 RX ADMIN — Medication 400 MILLIGRAM(S): at 00:25

## 2022-07-18 RX ADMIN — Medication 1000 MILLIGRAM(S): at 19:21

## 2022-07-18 RX ADMIN — PANTOPRAZOLE SODIUM 40 MILLIGRAM(S): 20 TABLET, DELAYED RELEASE ORAL at 12:00

## 2022-07-18 RX ADMIN — PIPERACILLIN AND TAZOBACTAM 25 GRAM(S): 4; .5 INJECTION, POWDER, LYOPHILIZED, FOR SOLUTION INTRAVENOUS at 22:02

## 2022-07-18 RX ADMIN — Medication 100 MILLIEQUIVALENT(S): at 13:15

## 2022-07-18 RX ADMIN — Medication 1000 MILLIGRAM(S): at 06:37

## 2022-07-18 RX ADMIN — Medication 400 MILLIGRAM(S): at 17:23

## 2022-07-18 RX ADMIN — Medication 100 MILLIEQUIVALENT(S): at 14:47

## 2022-07-18 RX ADMIN — HYDROMORPHONE HYDROCHLORIDE 0.5 MILLIGRAM(S): 2 INJECTION INTRAMUSCULAR; INTRAVENOUS; SUBCUTANEOUS at 06:12

## 2022-07-18 RX ADMIN — Medication 85 MILLIMOLE(S): at 17:23

## 2022-07-18 RX ADMIN — Medication 400 MILLIGRAM(S): at 23:17

## 2022-07-18 RX ADMIN — HYDROMORPHONE HYDROCHLORIDE 0.5 MILLIGRAM(S): 2 INJECTION INTRAMUSCULAR; INTRAVENOUS; SUBCUTANEOUS at 06:37

## 2022-07-18 RX ADMIN — Medication 400 MILLIGRAM(S): at 12:01

## 2022-07-18 RX ADMIN — DEXTROSE MONOHYDRATE, SODIUM CHLORIDE, AND POTASSIUM CHLORIDE 90 MILLILITER(S): 50; .745; 4.5 INJECTION, SOLUTION INTRAVENOUS at 12:01

## 2022-07-18 RX ADMIN — Medication 400 MILLIGRAM(S): at 06:12

## 2022-07-18 RX ADMIN — Medication 1000 MILLIGRAM(S): at 00:30

## 2022-07-18 NOTE — PROGRESS NOTE ADULT - ASSESSMENT
66 year male with history of daily ETOH use who presents with peritonitis and found to have moderate amount of intraabdominal free air concern for perforated viscus. s/p open ayanna patch repair of pre-pyloric perforated ulcer POD2    Plan  NPO, cont NGT until has consistent bowel function  cont jamie to bulb suction  continue zosyn   pain control  f/u H. pylori testing     ACS [id number]

## 2022-07-18 NOTE — SBIRT NOTE ADULT - NSSBIRTALCPOSREINDET_GEN_A_CORE
Patient within healthy drinking guidelines. Patient motivated to remain in healthy guidelines, and shared he does not drink much alcohol, but prefers other non alcoholic drinks.  provided positive reinforcement.

## 2022-07-18 NOTE — PROGRESS NOTE ADULT - SUBJECTIVE AND OBJECTIVE BOX
TRAUMA SURGERY DAILY PROGRESS NOTE:     SUBJECTIVE/ROS: Patient seen and examined, feels well. Passing flatus, no bm yet. NGT in place, denies N/V.      MEDICATIONS  (STANDING):  acetaminophen   IVPB .. 1000 milliGRAM(s) IV Intermittent every 6 hours  dextrose 5% + sodium chloride 0.45% with potassium chloride 20 mEq/L 1000 milliLiter(s) (90 mL/Hr) IV Continuous <Continuous>  enoxaparin Injectable 40 milliGRAM(s) SubCutaneous every 24 hours  nicotine -  14 mG/24Hr(s) Patch 1 Patch Transdermal daily  piperacillin/tazobactam IVPB.. 3.375 Gram(s) IV Intermittent every 8 hours    MEDICATIONS  (PRN):  diazepam  Injectable 20 milliGRAM(s) IV Push <User Schedule> PRN For CIWA > 8  HYDROmorphone  Injectable 0.5 milliGRAM(s) IV Push every 6 hours PRN Moderate Pain (4 - 6)      OBJECTIVE:    Vital Signs Last 24 Hrs  T(C): 36.7 (18 Jul 2022 05:03), Max: 37.1 (17 Jul 2022 14:34)  T(F): 98 (18 Jul 2022 05:03), Max: 98.7 (17 Jul 2022 14:34)  HR: 65 (18 Jul 2022 05:03) (65 - 76)  BP: 127/79 (18 Jul 2022 05:03) (96/59 - 138/73)  BP(mean): --  RR: 18 (18 Jul 2022 05:03) (18 - 18)  SpO2: 93% (18 Jul 2022 05:03) (92% - 94%)    Parameters below as of 18 Jul 2022 05:03  Patient On (Oxygen Delivery Method): room air            I&O's Detail    17 Jul 2022 07:01  -  18 Jul 2022 07:00  --------------------------------------------------------  IN:    dextrose 5% + sodium chloride 0.45% w/ Additives: 1980 mL    IV PiggyBack: 200 mL    IV PiggyBack: 100 mL  Total IN: 2280 mL    OUT:    Bulb (mL): 45 mL    Indwelling Catheter - Urethral (mL): 1200 mL    Nasogastric/Oral tube (mL): 1100 mL    Oral Fluid: 0 mL  Total OUT: 2345 mL    Total NET: -65 mL          Daily     Daily     LABS:                        13.0   11.57 )-----------( 168      ( 17 Jul 2022 07:45 )             39.0     07-17    141  |  106  |  14  ----------------------------<  116<H>  3.7   |  24  |  1.26    Ca    8.4      17 Jul 2022 07:41  Phos  2.0     07-17  Mg     1.9     07-17    TPro  5.9<L>  /  Alb  3.2<L>  /  TBili  1.5<H>  /  DBili  x   /  AST  17  /  ALT  13  /  AlkPhos  60  07-17                  PHYSICAL EXAM:    General: Appears well, NAD  Neuro: AAOx3  CHEST: Clear to auscultation bilaterally  CV: Regular rate and rhythm  Abdomen: soft, incisional tenderness, nondistended, no rebound or guarding, ARIADNA serosang  Extremities: Grossly symmetric

## 2022-07-19 LAB
ANION GAP SERPL CALC-SCNC: 12 MMOL/L — SIGNIFICANT CHANGE UP (ref 5–17)
BUN SERPL-MCNC: 8 MG/DL — SIGNIFICANT CHANGE UP (ref 7–23)
CALCIUM SERPL-MCNC: 8.3 MG/DL — LOW (ref 8.4–10.5)
CHLORIDE SERPL-SCNC: 105 MMOL/L — SIGNIFICANT CHANGE UP (ref 96–108)
CO2 SERPL-SCNC: 21 MMOL/L — LOW (ref 22–31)
CREAT SERPL-MCNC: 1.01 MG/DL — SIGNIFICANT CHANGE UP (ref 0.5–1.3)
EGFR: 83 ML/MIN/1.73M2 — SIGNIFICANT CHANGE UP
GLUCOSE SERPL-MCNC: 121 MG/DL — HIGH (ref 70–99)
HCT VFR BLD CALC: 37.6 % — LOW (ref 39–50)
HGB BLD-MCNC: 12.9 G/DL — LOW (ref 13–17)
MAGNESIUM SERPL-MCNC: 2 MG/DL — SIGNIFICANT CHANGE UP (ref 1.6–2.6)
MCHC RBC-ENTMCNC: 32.5 PG — SIGNIFICANT CHANGE UP (ref 27–34)
MCHC RBC-ENTMCNC: 34.3 GM/DL — SIGNIFICANT CHANGE UP (ref 32–36)
MCV RBC AUTO: 94.7 FL — SIGNIFICANT CHANGE UP (ref 80–100)
NRBC # BLD: 0 /100 WBCS — SIGNIFICANT CHANGE UP (ref 0–0)
PHOSPHATE SERPL-MCNC: 2.7 MG/DL — SIGNIFICANT CHANGE UP (ref 2.5–4.5)
PLATELET # BLD AUTO: 173 K/UL — SIGNIFICANT CHANGE UP (ref 150–400)
POTASSIUM SERPL-MCNC: 3.6 MMOL/L — SIGNIFICANT CHANGE UP (ref 3.5–5.3)
POTASSIUM SERPL-SCNC: 3.6 MMOL/L — SIGNIFICANT CHANGE UP (ref 3.5–5.3)
RBC # BLD: 3.97 M/UL — LOW (ref 4.2–5.8)
RBC # FLD: 12.2 % — SIGNIFICANT CHANGE UP (ref 10.3–14.5)
SODIUM SERPL-SCNC: 138 MMOL/L — SIGNIFICANT CHANGE UP (ref 135–145)
WBC # BLD: 7.77 K/UL — SIGNIFICANT CHANGE UP (ref 3.8–10.5)
WBC # FLD AUTO: 7.77 K/UL — SIGNIFICANT CHANGE UP (ref 3.8–10.5)

## 2022-07-19 RX ORDER — POTASSIUM PHOSPHATE, MONOBASIC POTASSIUM PHOSPHATE, DIBASIC 236; 224 MG/ML; MG/ML
30 INJECTION, SOLUTION INTRAVENOUS ONCE
Refills: 0 | Status: COMPLETED | OUTPATIENT
Start: 2022-07-19 | End: 2022-07-19

## 2022-07-19 RX ADMIN — PIPERACILLIN AND TAZOBACTAM 25 GRAM(S): 4; .5 INJECTION, POWDER, LYOPHILIZED, FOR SOLUTION INTRAVENOUS at 05:22

## 2022-07-19 RX ADMIN — ENOXAPARIN SODIUM 40 MILLIGRAM(S): 100 INJECTION SUBCUTANEOUS at 05:24

## 2022-07-19 RX ADMIN — POTASSIUM PHOSPHATE, MONOBASIC POTASSIUM PHOSPHATE, DIBASIC 83.33 MILLIMOLE(S): 236; 224 INJECTION, SOLUTION INTRAVENOUS at 10:47

## 2022-07-19 RX ADMIN — PIPERACILLIN AND TAZOBACTAM 25 GRAM(S): 4; .5 INJECTION, POWDER, LYOPHILIZED, FOR SOLUTION INTRAVENOUS at 21:12

## 2022-07-19 RX ADMIN — Medication 1000 MILLIGRAM(S): at 00:17

## 2022-07-19 RX ADMIN — PIPERACILLIN AND TAZOBACTAM 25 GRAM(S): 4; .5 INJECTION, POWDER, LYOPHILIZED, FOR SOLUTION INTRAVENOUS at 14:13

## 2022-07-19 RX ADMIN — DEXTROSE MONOHYDRATE, SODIUM CHLORIDE, AND POTASSIUM CHLORIDE 90 MILLILITER(S): 50; .745; 4.5 INJECTION, SOLUTION INTRAVENOUS at 02:07

## 2022-07-19 RX ADMIN — PANTOPRAZOLE SODIUM 40 MILLIGRAM(S): 20 TABLET, DELAYED RELEASE ORAL at 12:18

## 2022-07-19 RX ADMIN — Medication 1000 MILLIGRAM(S): at 12:04

## 2022-07-19 RX ADMIN — Medication 400 MILLIGRAM(S): at 11:34

## 2022-07-19 RX ADMIN — DEXTROSE MONOHYDRATE, SODIUM CHLORIDE, AND POTASSIUM CHLORIDE 90 MILLILITER(S): 50; .745; 4.5 INJECTION, SOLUTION INTRAVENOUS at 10:47

## 2022-07-19 RX ADMIN — DEXTROSE MONOHYDRATE, SODIUM CHLORIDE, AND POTASSIUM CHLORIDE 90 MILLILITER(S): 50; .745; 4.5 INJECTION, SOLUTION INTRAVENOUS at 14:10

## 2022-07-19 RX ADMIN — Medication 400 MILLIGRAM(S): at 05:24

## 2022-07-19 RX ADMIN — Medication 1000 MILLIGRAM(S): at 06:24

## 2022-07-19 NOTE — PHYSICAL THERAPY INITIAL EVALUATION ADULT - ASR EQUIP NEEDS DISCH PT EVAL
TBD @ Rehab. If home, patient would require RW and supervision for all aspects of functional mobility and self care at this time for safety.

## 2022-07-19 NOTE — PHYSICAL THERAPY INITIAL EVALUATION ADULT - GENERAL OBSERVATIONS, REHAB EVAL
Pt received semi-supine in bed in NAD, +IVF, +masimo monitoring, +R ARIADNA drain, VSS, agreeable to participate in therapy at this time

## 2022-07-19 NOTE — DIETITIAN INITIAL EVALUATION ADULT - ENERGY INTAKE
NPO x 2 days, advanced to clear liquids this morning./Poor (<50%) In-house pt NPO x 2 days, NGT discontinued 6/18. Advanced to clear liquid diet this morning, tolerating well so far. No nausea. Discussed typical diet advancement. Patient with no nutrition-related questions at this time. Made aware RD remains available as needed and can follow-up with diet education as appropriate/warranted.

## 2022-07-19 NOTE — DIETITIAN INITIAL EVALUATION ADULT - OTHER INFO
Weight: pt reports unsure of  current weight, states it is usually ~200lbs, current dosing weight 190lbs. Will continue to monitor.

## 2022-07-19 NOTE — PHYSICAL THERAPY INITIAL EVALUATION ADULT - PERTINENT HX OF CURRENT PROBLEM, REHAB EVAL
67yo M with history of daily ETOH use who presented to the ED with one day of acute severe abdominal pain. Patient reports he was eating when he had severe pain associated with nausea. Denies fever, chills, vomiting. Have not seen a doctor in a long time. Report drinks 3 shots of vodka every night. Found to have moderate amount of intraabdominal free air concern for perforated viscus. Diagnosed with Perforated peptic ulcer. CONTINUED:

## 2022-07-19 NOTE — DIETITIAN INITIAL EVALUATION ADULT - TIME FRAME
Subjective:      Patient ID: Bobbye Moritz is a 78 y.o. female  Dizziness and ear pain x 1 week  HPI  Pt presents with 1 week of sudden onset of dizziness described as feeling off balance(not lightheadedness). Related to moving around, not rising up. Attest to occasional feeling of herself spinning, no ear ringing or hearing loss. No CP, LOC, headache. Attests to right facial and head tingling and num ness. No slurred speech or one-sided weakness. No new meds, no headache. Hx trigeminal neuralgia s/p surgery 2015. Hx 35 pack yr tobacco smoking, quit 7 yrs ago. Also right stabbing ear pain x 1 week. Assoc crusty brown ear discharge. No sinus congestion or antecedent trauma, no swimming. Hx T2Dm A1c 7. Past Medical History:   Diagnosis Date    Anxiety     DM (diabetes mellitus) ([facility] [address] 4/14/2015    Hepatitis B infection     Hyperlipidemia     Hypothyroidism     Insomnia     Leg pain 4/14/2015    Post herpetic neuralgia     Unspecified sleep apnea     after [facility] they state she is does not have sleep apnea. Not using cpap     Past Surgical History:   Procedure Laterality Date    ANKLE FRACTURE SURGERY  09/2016    DR ANDRADE  LT    BIOPSY / LIGATION TEMPORAL ARTERY Right 4/26/2019    RIGHT TEMPORAL ARTERY BIOPSY performed by Qing Sanford MD at [facility]  2015    surgery for transgeminal neuraglia. Insertion of sponge for chronic pain    COLONOSCOPY  3/31/14    DR Grande Hem    HYSTERECTOMY      PARTIAL    LYMPHADENECTOMY      OTHER SURGICAL HISTORY Right     Trigeminal Neuralgia    CT COLON CA SCRN NOT  W [address] [place] [address] N/A 7/24/2017    COLONOSCOPY performed by Jennifer Guzmán MD at [address] [place] [address] Road History     Socioeconomic History    Marital status:       Spouse name: Not on file    Number of children: Not on file    Years of education: Not on file    Highest education level: Not on file   Occupational History    Not on file   Social Needs  Financial resource strain: Not on file   Arabella-Yohannes insecurity     Worry: Not on file     Inability: Not on file   [employer] needs     Medical: Not on file     Non-medical: Not on file   Tobacco Use    Smoking status: Former Smoker     Packs/day: 1.00     Years: 48.00     Pack years: 48.00     Types: Cigarettes     Last attempt to quit: 2013     Years since quittin.0    Smokeless tobacco: Never Used   Substance and Sexual Activity    Alcohol use: No     Comment: very rarely    Drug use: No    Sexual activity: Yes     Partners: Male   Lifestyle    Physical activity     Days per week: Not on file     Minutes per session: Not on file    Stress: Not on file   Relationships    Social connections     Talks on phone: Not on file     Gets together: Not on file     Attends Rastafarian service: Not on file     Active member of club or organization: Not on file     Attends meetings of clubs or organizations: Not on file     Relationship status: Not on file    Intimate partner violence     Fear of current or ex partner: Not on file     Emotionally abused: Not on file     Physically abused: Not on file     Forced sexual activity: Not on file   Other Topics Concern    Not on file   Social History Narrative    Not on file     Family History   Problem Relation Age of Onset    Stroke Mother     High Cholesterol Mother     Cancer Mother      Allergies:  Cefdinir and Metformin and related  Patient Active Problem List   Diagnosis    Hepatitis B infection    Sleep apnea    Anxiety    Insomnia    Trigeminal neuralgia    Hyperlipidemia    Hypothyroidism    Dyspnea    DM [facility]diabetes mellitus) ([facility] [address]    Leg pain    Depression    Frequent headaches    Major depressive disorder, recurrent, in full remission ([facility] [address]    Cherry angioma    Diffuse photodamage of skin    Dilated pore of Solomon    GERD (gastroesophageal reflux disease)    Hidradenitis suppurativa    Lentigines    Multiple benign nevi    Poikiloderma    Seborrheic keratosis    Morbidly obese (HCC)     Current Outpatient Medications on File Prior to Visit   Medication Sig Dispense Refill    citalopram (CELEXA) 20 MG tablet TAKE 1 TABLET DAILY (NEED APPOINTMENT FOR FURTHER REFILLS) 90 tablet 2    JANUVIA 100 MG tablet TAKE 1 TABLET DAILY 90 tablet 4    levothyroxine (SYNTHROID) 150 MCG tablet TAKE 1 TABLET DAILY (NEED APPOINTMENT FOR FURTHER REFILLS) 90 tablet 4    famotidine (PEPCID) 20 MG tablet TAKE 1 TABLET TWICE A DAY (NEED APPOINTMENT FOR FURTHER REFILLS) 180 tablet 4    Blood Glucose Monitoring Suppl (TRUE METRIX METER) w/Device KIT use to test sugar  0    blood glucose test strips (TRUETEST TEST) strip Test blood glucose fasting and 1 hour post lunch and dinner 100 each 5    Lancets MISC Inject 1 each into the skin 3 times daily Use early in the morning fasting and 1 hour post lunch and dinner 100 each 5    aspirin 81 MG EC tablet Take 1 tablet by mouth daily 90 tablet 3    celecoxib (CELEBREX) 200 MG capsule TAKE 1 CAPSULE DAILY 90 capsule 0     No current facility-administered medications on file prior to visit. Review of Systems   Constitutional: Negative for chills, diaphoresis, fatigue and fever. HENT: Positive for ear discharge and ear pain. Negative for congestion, rhinorrhea, sinus pressure, sinus pain, sneezing and sore throat. Respiratory: Negative for cough, shortness of breath and wheezing. Cardiovascular: Negative for chest pain. Gastrointestinal: Negative for abdominal pain, diarrhea, nausea and vomiting. Endocrine: Negative for cold intolerance and heat intolerance. Genitourinary: Negative for dysuria and frequency. Neurological: Positive for dizziness and numbness. Negative for syncope, facial asymmetry, speech difficulty, weakness, light-headedness and headaches.      Objective:   /62 (Site: Left Upper Arm, Position: Sitting, Cuff Size: Medium Adult)   Pulse 87   Temp 97.8 °F (36.6 °C) (Temporal)   Resp 18   Ht 5' 4\" (1.626 m)   Wt 188 lb 12.8 oz (85.6 kg)   LMP 02/28/1982   SpO2 97%   BMI 32.41 kg/m²     Physical Exam  Constitutional:       General: She is not in acute distress. Appearance: Normal appearance. She is well-developed. HENT:      Ears:      Comments: Right tragus erythema and marked TTP with auditory canal edema, erythema and cerumen impaction. No TM or pharyngeal erythema   Cardiovascular:      Rate and Rhythm: Normal rate and regular rhythm. Pulses: Normal pulses. Heart sounds: Normal heart sounds. No murmur. Pulmonary:      Effort: Pulmonary effort is normal. No respiratory distress. Breath sounds: Normal breath sounds. No wheezing. Abdominal:      General: Bowel sounds are normal. There is no distension. Palpations: Abdomen is soft. There is no mass. Tenderness: There is no abdominal tenderness. There is no guarding or rebound. Neurological:      General: No focal deficit present. Mental Status: She is alert and oriented to person, place, and time. Mental status is at baseline. Cranial Nerves: No cranial nerve deficit. Motor: No weakness. Coordination: Coordination normal.      Gait: Gait normal.   Psychiatric:         Mood and Affect: Mood normal.         Behavior: Behavior normal.         Assessment:       Diagnosis Orders   1. Vertigo  MRI BRAIN W WO CONTRAST   2. Acute otitis externa of right ear, unspecified type  ciprofloxacin-dexamethasone (CIPRODEX) 0.3-0.1 % otic suspension   3.  Impacted cerumen of right ear  28605 - ME REMOVE IMPACTED EAR WAX       Plan:      Orders Placed This Encounter   Procedures    MRI BRAIN W WO CONTRAST     Standing Status:   Future     Number of Occurrences:   1     Standing Expiration Date:   7/28/2021    19424 - ME REMOVE IMPACTED EAR WAX     Orders Placed This Encounter   Medications    ciprofloxacin-dexamethasone (CIPRODEX) 0.3-0.1 % otic suspension     Sig: Place 4 drops into the right ear 2 times daily for 10 days     Dispense:  1 Bottle     Refill:  0     Return in about 2 weeks (around 8/11/2020) for follow up. unknown timeframe

## 2022-07-19 NOTE — PROGRESS NOTE ADULT - SUBJECTIVE AND OBJECTIVE BOX
ACUTE CARE SURGERY DAILY PROGRESS NOTE:       Subjective / Overnight events:  No acute overnight events.  Pain controlled.  +flatus, no BM.      Objective:      MEDICATIONS  (STANDING):  acetaminophen   IVPB .. 1000 milliGRAM(s) IV Intermittent every 6 hours  dextrose 5% + sodium chloride 0.45% with potassium chloride 20 mEq/L 1000 milliLiter(s) (90 mL/Hr) IV Continuous <Continuous>  enoxaparin Injectable 40 milliGRAM(s) SubCutaneous every 24 hours  nicotine -  14 mG/24Hr(s) Patch 1 Patch Transdermal daily  pantoprazole  Injectable 40 milliGRAM(s) IV Push daily  piperacillin/tazobactam IVPB.. 3.375 Gram(s) IV Intermittent every 8 hours    MEDICATIONS  (PRN):  diazepam  Injectable 20 milliGRAM(s) IV Push <User Schedule> PRN For CIWA > 8  HYDROmorphone  Injectable 0.5 milliGRAM(s) IV Push every 6 hours PRN Moderate Pain (4 - 6)      Vital Signs Last 24 Hrs  T(C): 36.7 (19 Jul 2022 06:38), Max: 37.5 (18 Jul 2022 12:22)  T(F): 98.1 (19 Jul 2022 06:38), Max: 99.5 (18 Jul 2022 12:22)  HR: 63 (19 Jul 2022 06:38) (60 - 70)  BP: 143/85 (19 Jul 2022 06:38) (113/71 - 143/85)  BP(mean): --  RR: 18 (19 Jul 2022 06:38) (18 - 18)  SpO2: 95% (19 Jul 2022 06:38) (94% - 98%)    Parameters below as of 19 Jul 2022 06:38  Patient On (Oxygen Delivery Method): room air        I&O's Detail    18 Jul 2022 07:01  -  19 Jul 2022 07:00  --------------------------------------------------------  IN:    dextrose 5% + sodium chloride 0.45% w/ Additives: 2160 mL    IV PiggyBack: 400 mL    IV PiggyBack: 300 mL  Total IN: 2860 mL    OUT:    Bulb (mL): 5 mL    Indwelling Catheter - Urethral (mL): 225 mL    Nasogastric/Oral tube (mL): 95 mL    Voided (mL): 2000 mL  Total OUT: 2325 mL    Total NET: 535 mL          Daily     Daily     LABS:                        12.9   9.55  )-----------( 167      ( 18 Jul 2022 07:21 )             38.7     07-18    139  |  104  |  12  ----------------------------<  117<H>  3.7   |  24  |  1.13    Ca    8.2<L>      18 Jul 2022 07:18  Phos  1.7     07-18  Mg     2.1     07-18    TPro  6.1  /  Alb  3.2<L>  /  TBili  1.0  /  DBili  x   /  AST  15  /  ALT  12  /  AlkPhos  62  07-18              PHYSICAL EXAM:    General: Appears well, NAD  Neuro: AAOx3  CHEST: Clear to auscultation bilaterally  CV: Regular rate and rhythm  Abdomen: soft, incisional tenderness, nondistended, no rebound or guarding, ARIADNA serosang  Extremities: Grossly symmetric

## 2022-07-19 NOTE — PHYSICAL THERAPY INITIAL EVALUATION ADULT - PATIENT/FAMILY AGREES WITH PLAN
[facility] is recommended. As per SW, patient with no insurance plan to progress home with HPT services/yes

## 2022-07-19 NOTE — PHYSICAL THERAPY INITIAL EVALUATION ADULT - DISCHARGE DISPOSITION, PT EVAL
Subacute Rehab Facility is recommended. Spoke with SW, patient is uninsured plan to progress home with [facility] services./rehabilitation facility

## 2022-07-19 NOTE — DIETITIAN INITIAL EVALUATION ADULT - NSFNSGIIOFT_GEN_A_CORE
07-18-22 @ 07:01  -  07-19-22 @ 07:00  --------------------------------------------------------  OUT:    Nasogastric/Oral tube (mL): 95 mL  Total OUT: 95 mL    Total NET: -95 mL

## 2022-07-19 NOTE — DIETITIAN INITIAL EVALUATION ADULT - NSFNSPHYEXAMSKINFT_GEN_A_CORE
skin: free of pressure injuries per nursing flow sheets, noted with midline abdomen surgical incision

## 2022-07-19 NOTE — PROGRESS NOTE ADULT - ATTENDING COMMENTS
Patient seen and examined and agree with above.   POD #1 s/p Chilo patch.   Hemodynamically stable. No acute events overnight.   NT 1100 cc/ 24 hours but passing flatus.   Will place on PPI, remove dean and NGT to gravity for trial.  On exam there is small bleeding from midline incision which was packed today. Will reassess and see if needs silver nitrate if continues to bleed.  Labs reviewed with improved WBC (9.55 from 11.57).  Will monitor patient and discontinue NGT if passes trial. If remove NGT will advance to clears.
Patient seen and examined and agree with above.   POD #2 s/p Chilo patch.   Hemodynamically stable. No acute events overnight.   Continues to be passing flatus.   Will advance to clear liquid diet.   No further bleeding from midline incision.   I have encourage ambulation for the patient.
ATTENDING ATTESTATION  I have seen and examined this patient on rounds thismorning with the surgery team. I have reviewed all new labs, imaging and reports. I have participated in formulating the plan for the day, and have read and agree with the history, ROS, exam, assessment and plan as stated above.     POD 1 from ayanna patch.   Doing well. Passing a small amount of flatus but still with moderate NGT output --> 600, bilious.   Will keep NGT for now.   D/C fluconazole in setting stability. Continue Zosyn for 4 days.   WBC 14-->11.   Will send h.pylor serology.   Plan for NGT removal when output lower and having real bowel function.     Leeann Palacios M.D., M.S.  Dept of Trauma, Acute and Critical Care

## 2022-07-19 NOTE — PHYSICAL THERAPY INITIAL EVALUATION ADULT - DIAGNOSIS, PT EVAL
Pt presents with postop abdominal pain, impairments in strength and endurance impacting ability to perform ADLs and functional mobility

## 2022-07-19 NOTE — DIETITIAN INITIAL EVALUATION ADULT - REASON FOR ADMISSION
Acute abdomen    Chart reviewed, events noted. This is a "66 year male with history of daily ETOH use who presents with peritonitis and found to have moderate amount of intraabdominal free air concern for perforated viscus. s/p open ayanna patch repair of pre-pyloric perforated ulcer POD 3"

## 2022-07-19 NOTE — PHYSICAL THERAPY INITIAL EVALUATION ADULT - ADDITIONAL COMMENTS
Chest X-Ray 7/16/22: Interval development of minimal left basilar atelectasis  CT Abdomen & Pelvis 7/15/22: Moderate pneumoperitoneum with small fluid adjacent to the distal stomach and proximal duodenum, which may represent the source of a perforated viscus. Expansion of the spinal canal within the proximal sacrum with associated scalloping of the vertebral bodies, which may represent a large Tarlov cyst. Other etiologies are not excluded. Nonemergent MRI may be obtained for further characterization, if no contraindications exist.

## 2022-07-19 NOTE — DIETITIAN INITIAL EVALUATION ADULT - ETIOLOGY
related to inability to consume adequate nutrition in the setting of intraabdominal free air concern for perforated viscus.

## 2022-07-19 NOTE — DIETITIAN INITIAL EVALUATION ADULT - PERTINENT MEDS FT
SUBJECTIVE:   CC: Angelita Hobbs is an 29 year old woman who presents for preventive health visit.     Healthy Habits:    Do you get at least three servings of calcium containing foods daily (dairy, green leafy vegetables, etc.)? yes    Amount of exercise or daily activities, outside of work: 5 hour(s) per day    Problems taking medications regularly No    Medication side effects: No    Have you had an eye exam in the past two years? no    Do you see a dentist twice per year? yes    Do you have sleep apnea, excessive snoring or daytime drowsiness?no          Today's PHQ-2 Score:   PHQ-2 ( 1999 [employer]) 4/25/2019 3/28/2019   Q1: Little interest or pleasure in doing things 0 0   Q2: Feeling down, depressed or hopeless 0 0   PHQ-2 Score 0 0       Abuse: Current or Past(Physical, Sexual or Emotional)- No  Do you feel safe in your environment? Yes    Social History     Tobacco Use     Smoking status: Never Smoker     Smokeless tobacco: Never Used   Substance Use Topics     Alcohol use: Yes     Comment: socially     If you drink alcohol do you typically have >3 drinks per day or >7 drinks per week? No                     Reviewed orders with patient.  Reviewed health maintenance and updated orders accordingly - Yes  BP Readings from Last 3 Encounters:   04/25/19 134/76   03/28/19 136/78   03/07/19 125/70    Wt Readings from Last 3 Encounters:   04/25/19 81.3 kg (179 lb 4.8 oz)   03/28/19 81.1 kg (178 lb 12.8 oz)   03/07/19 79.8 kg (176 lb)                  Patient Active Problem List   Diagnosis     Secondary amenorrhea     Scanty or infrequent menstruation     Screening examination for venereal disease     Nexplanon insertion     Adjustment disorder with anxious mood     ASCUS of cervix with negative high risk HPV     Past Surgical History:   Procedure Laterality Date     EXCISE MASS BUTTOCK(S) Right 3/7/2019    Procedure: Excision of Right Buttocks Mass;  Surgeon: Carmelian Barr MD;  Location:  OR      HERNIA REPAIR, INGUINAL RT/LT  1996    Right     MOUTH SURGERY  2009    [place] teeth       Social History     Tobacco Use     Smoking status: Never Smoker     Smokeless tobacco: Never Used   Substance Use Topics     Alcohol use: Yes     Comment: socially     Family History   Problem Relation Age of Onset     Cancer Maternal Grandfather         lung     Heart Disease Maternal Grandfather         valve replaced     Arthritis Paternal Grandfather      Cancer Paternal Grandfather         skin     Diabetes Paternal Grandmother      Anxiety Disorder Maternal Grandmother      Breast Cancer Other          Current Outpatient Medications   Medication Sig Dispense Refill     cetirizine (ZYRTEC) 10 MG tablet Take 10 mg by mouth daily       clonazePAM (KLONOPIN) 0.5 MG tablet Take 0.5 tablets (0.25 mg) by mouth 2 times daily as needed for anxiety 20 tablet 1     etonogestrel (IMPLANON/NEXPLANON) 68 MG IMPL 1 each by Subdermal route once       No Known Allergies    Mammogram not appropriate for this patient based on age.    Pertinent mammograms are reviewed under the imaging tab.  History of abnormal Pap smear: NO - age 21-29 PAP every 3 years recommended  PAP / HPV Latest Ref Rng & Units 3/8/2016 12/17/2012   PAP - NIL NIL   HPV 16 DNA NEG Negative -   HPV 18 DNA NEG Negative -   OTHER HR HPV NEG Negative -     Reviewed and updated as needed this visit by clinical staff  Tobacco  Allergies  Meds  Med Hx  Surg Hx  Fam Hx  Soc Hx        Reviewed and updated as needed this visit by Provider        Past Medical History:   Diagnosis Date     Abnormal Pap smear of cervix 04/25/2019    See problem list     Irregular menses      Menarche     age 16      Past Surgical History:   Procedure Laterality Date     EXCISE MASS BUTTOCK(S) Right 3/7/2019    Procedure: Excision of Right Buttocks Mass;  Surgeon: Carmelina Barr MD;  Location: [facility] OR     HERNIA REPAIR, INGUINAL RT/LT  1996    Right     MOUTH SURGERY  2009     "[place] teeth     OB History    Para Term  AB Living   0 0 0 0 0 0   SAB TAB Ectopic Multiple Live Births   0 0 0 0 0       ROS:  CONSTITUTIONAL: NEGATIVE for fever, chills, change in weight  INTEGUMENTARU/SKIN: NEGATIVE for worrisome rashes, moles or lesions  EYES: NEGATIVE for vision changes or irritation  ENT: NEGATIVE for ear, mouth and throat problems  RESP: NEGATIVE for significant cough or SOB  BREAST: NEGATIVE for masses, tenderness or discharge  CV: NEGATIVE for chest pain, palpitations or peripheral edema  GI: NEGATIVE for nausea, abdominal pain, heartburn, or change in bowel habits  : NEGATIVE for unusual urinary or vaginal symptoms. Periods are regular.  MUSCULOSKELETAL: NEGATIVE for significant arthralgias or myalgia  NEURO: NEGATIVE for weakness, dizziness or paresthesias  ENDOCRINE: hair growth-abnormal shaving daily  Terminal hair growth  on her abdomen  HEME/ALLERGY/IMMUNE: NEGATIVE for bleeding problems  PSYCHIATRIC: NEGATIVE for changes in mood or affect    OBJECTIVE:   /76   Pulse 91   Temp 96.8  F (36  C)   Resp 16   Ht 1.753 m (5' 9\")   Wt 81.3 kg (179 lb 4.8 oz)   LMP 2019   BMI 26.48 kg/m    EXAM:  GENERAL: healthy, alert and no distress  EYES: Eyes grossly normal to inspection, PERRL and conjunctivae and sclerae normal  NECK: no adenopathy, no asymmetry, masses, or scars and thyroid normal to palpation  RESP: lungs clear to auscultation - no rales, rhonchi or wheezes  CV: regular rate and rhythm, normal S1 S2, no S3 or S4, no murmur, click or rub, no peripheral edema and peripheral pulses strong  ABDOMEN: soft, nontender, no hepatosplenomegaly, no masses and bowel sounds normal   (female): normal female external genitalia, normal urethral meatus, vaginal mucosa pink, moist, well rugated, and normal cervix/adnexa/uterus without masses or discharge  MS: no gross musculoskeletal defects noted, no edema  SKIN: no suspicious lesions or rashes and hair quality " "ternminal hair growth in a triangular pattern over her lower abdomen  NEURO: Normal strength and tone, mentation intact and speech normal  PSYCH: mentation appears normal, affect normal/bright  LYMPH: no cervical, supraclavicular, axillary, or inguinal adenopathy    Diagnostic Test Results:  none     ASSESSMENT/PLAN:   1. Encounter for gynecological examination with abnormal finding  Hirsutism  Recent partner separation  - Chlamydia trachomatis PCR  - Neisseria gonorrhoeae PCR  - Pap imaged thin layer screen with HPV - recommended age 30 - 65 years (select HPV order below)  - HPV High Risk Types DNA Cervical    2. Hirsutism    - DHEA sulfate  - **Testosterone Free and Total FUTURE anytime; Future  - Follicle stimulating hormone  - Lutropin  - TSH    COUNSELING:   Reviewed preventive health counseling, as reflected in patient instructions       Regular exercise       Healthy diet/nutrition       Safe sex practices/STD prevention       excessive hair growth    BP Readings from Last 1 Encounters:   04/25/19 134/76     Estimated body mass index is 26.48 kg/m  as calculated from the following:    Height as of this encounter: 1.753 m (5' 9\").    Weight as of this encounter: 81.3 kg (179 lb 4.8 oz).           reports that she has never smoked. She has never used smokeless tobacco.      Counseling Resources:  ATP IV Guidelines  Pooled Cohorts Equation Calculator  Breast Cancer Risk Calculator  FRAX Risk Assessment  ICSI Preventive Guidelines  Dietary Guidelines for Americans, 2010  USDA's MyPlate  ASA Prophylaxis  Lung CA Screening    Dejan Camejo MD  [facility]  " MEDICATIONS  (STANDING):  acetaminophen   IVPB .. 1000 milliGRAM(s) IV Intermittent every 6 hours  dextrose 5% + sodium chloride 0.45% with potassium chloride 20 mEq/L 1000 milliLiter(s) (90 mL/Hr) IV Continuous <Continuous>  enoxaparin Injectable 40 milliGRAM(s) SubCutaneous every 24 hours  nicotine -  14 mG/24Hr(s) Patch 1 Patch Transdermal daily  pantoprazole  Injectable 40 milliGRAM(s) IV Push daily  piperacillin/tazobactam IVPB.. 3.375 Gram(s) IV Intermittent every 8 hours  potassium phosphate IVPB 30 milliMole(s) IV Intermittent once    MEDICATIONS  (PRN):  diazepam  Injectable 20 milliGRAM(s) IV Push <User Schedule> PRN For CIWA > 8  HYDROmorphone  Injectable 0.5 milliGRAM(s) IV Push every 6 hours PRN Moderate Pain (4 - 6)

## 2022-07-19 NOTE — PHYSICAL THERAPY INITIAL EVALUATION ADULT - LIVES WITH, PROFILE
Pt resides with disabled friend in private home with 6 steps to enter with HR assist and first floor setup. Pt reports being functionally independent prior to admit with no DME required for ambulation. Pt does not work or drive at this time./friend

## 2022-07-19 NOTE — DIETITIAN INITIAL EVALUATION ADULT - ORAL INTAKE PTA/DIET HISTORY
Pt reports good PO intake and appetite at baseline. Consumes 3 meals per day, follows regular diet with no therapeutic restrictions. Pt reported abdominal pain, nausea and emesis x few hours prior prompting admission to ED. NKFA. Pt denies chewing/swallowing difficulty, nausea, vomiting, diarrhea, constipation. Of note per chart, pt reports drinking ~ 3 shots of vodka in the evening.

## 2022-07-19 NOTE — PHYSICAL THERAPY INITIAL EVALUATION ADULT - PRECAUTIONS/LIMITATIONS, REHAB EVAL
Patient was taken to the OR 7/16 for s/p Exploratory laparotomy and repair of perforated pyloric ulcer using Chilo patch. PT admitted to [address] [place] [address] for further management and monitoring./fall precautions

## 2022-07-19 NOTE — DIETITIAN INITIAL EVALUATION ADULT - PERTINENT LABORATORY DATA
07-19    138  |  105  |  8   ----------------------------<  121<H>  3.6   |  21<L>  |  1.01    Ca    8.3<L>      19 Jul 2022 07:31  Phos  2.7     07-19  Mg     2.0     07-19    TPro  6.1  /  Alb  3.2<L>  /  TBili  1.0  /  DBili  x   /  AST  15  /  ALT  12  /  AlkPhos  62  07-18

## 2022-07-19 NOTE — PROGRESS NOTE ADULT - ASSESSMENT
66 year male with history of daily ETOH use who presents with peritonitis and found to have moderate amount of intraabdominal free air concern for perforated viscus. s/p open ayanna patch repair of pre-pyloric perforated ulcer POD 3    Plan  - possibly advance to clears today  - continue ARIADNA to ss  - continue zosyn   - pain control  - f/u H. pylori testing     ACS [id number]

## 2022-07-19 NOTE — DIETITIAN INITIAL EVALUATION ADULT - ADD RECOMMEND
1) Medical team to advance diet as tolerated. Consider advancing to low fiber diet. 2) Continue Promote supplement with meals. RD to add high protein jello 2x daily. 3) RD to remain available and follow-up as medically appropriate.

## 2022-07-20 ENCOUNTER — TRANSCRIPTION ENCOUNTER (OUTPATIENT)
Age: 67
End: 2022-07-20

## 2022-07-20 LAB
ALBUMIN SERPL ELPH-MCNC: 3.4 G/DL — SIGNIFICANT CHANGE UP (ref 3.3–5)
ALP SERPL-CCNC: 64 U/L — SIGNIFICANT CHANGE UP (ref 40–120)
ALT FLD-CCNC: 20 U/L — SIGNIFICANT CHANGE UP (ref 10–45)
ANION GAP SERPL CALC-SCNC: 15 MMOL/L — SIGNIFICANT CHANGE UP (ref 5–17)
AST SERPL-CCNC: 23 U/L — SIGNIFICANT CHANGE UP (ref 10–40)
BILIRUB SERPL-MCNC: 0.7 MG/DL — SIGNIFICANT CHANGE UP (ref 0.2–1.2)
BUN SERPL-MCNC: 10 MG/DL — SIGNIFICANT CHANGE UP (ref 7–23)
CALCIUM SERPL-MCNC: 8.8 MG/DL — SIGNIFICANT CHANGE UP (ref 8.4–10.5)
CHLORIDE SERPL-SCNC: 104 MMOL/L — SIGNIFICANT CHANGE UP (ref 96–108)
CO2 SERPL-SCNC: 19 MMOL/L — LOW (ref 22–31)
CREAT SERPL-MCNC: 1.01 MG/DL — SIGNIFICANT CHANGE UP (ref 0.5–1.3)
EGFR: 83 ML/MIN/1.73M2 — SIGNIFICANT CHANGE UP
GLUCOSE SERPL-MCNC: 103 MG/DL — HIGH (ref 70–99)
HCT VFR BLD CALC: 39 % — SIGNIFICANT CHANGE UP (ref 39–50)
HGB BLD-MCNC: 13.5 G/DL — SIGNIFICANT CHANGE UP (ref 13–17)
MAGNESIUM SERPL-MCNC: 1.9 MG/DL — SIGNIFICANT CHANGE UP (ref 1.6–2.6)
MCHC RBC-ENTMCNC: 32.7 PG — SIGNIFICANT CHANGE UP (ref 27–34)
MCHC RBC-ENTMCNC: 34.6 GM/DL — SIGNIFICANT CHANGE UP (ref 32–36)
MCV RBC AUTO: 94.4 FL — SIGNIFICANT CHANGE UP (ref 80–100)
NRBC # BLD: 0 /100 WBCS — SIGNIFICANT CHANGE UP (ref 0–0)
PHOSPHATE SERPL-MCNC: 3 MG/DL — SIGNIFICANT CHANGE UP (ref 2.5–4.5)
PLATELET # BLD AUTO: 210 K/UL — SIGNIFICANT CHANGE UP (ref 150–400)
POTASSIUM SERPL-MCNC: 3.4 MMOL/L — LOW (ref 3.5–5.3)
POTASSIUM SERPL-SCNC: 3.4 MMOL/L — LOW (ref 3.5–5.3)
PROT SERPL-MCNC: 6.5 G/DL — SIGNIFICANT CHANGE UP (ref 6–8.3)
RBC # BLD: 4.13 M/UL — LOW (ref 4.2–5.8)
RBC # FLD: 12 % — SIGNIFICANT CHANGE UP (ref 10.3–14.5)
SODIUM SERPL-SCNC: 138 MMOL/L — SIGNIFICANT CHANGE UP (ref 135–145)
WBC # BLD: 5.91 K/UL — SIGNIFICANT CHANGE UP (ref 3.8–10.5)
WBC # FLD AUTO: 5.91 K/UL — SIGNIFICANT CHANGE UP (ref 3.8–10.5)

## 2022-07-20 RX ORDER — OXYCODONE HYDROCHLORIDE 5 MG/1
10 TABLET ORAL EVERY 4 HOURS
Refills: 0 | Status: DISCONTINUED | OUTPATIENT
Start: 2022-07-20 | End: 2022-07-21

## 2022-07-20 RX ORDER — POTASSIUM CHLORIDE 20 MEQ
40 PACKET (EA) ORAL ONCE
Refills: 0 | Status: DISCONTINUED | OUTPATIENT
Start: 2022-07-20 | End: 2022-07-21

## 2022-07-20 RX ORDER — OXYCODONE HYDROCHLORIDE 5 MG/1
5 TABLET ORAL EVERY 4 HOURS
Refills: 0 | Status: DISCONTINUED | OUTPATIENT
Start: 2022-07-20 | End: 2022-07-21

## 2022-07-20 RX ORDER — PANTOPRAZOLE SODIUM 20 MG/1
40 TABLET, DELAYED RELEASE ORAL DAILY
Refills: 0 | Status: DISCONTINUED | OUTPATIENT
Start: 2022-07-20 | End: 2022-07-21

## 2022-07-20 RX ORDER — POTASSIUM CHLORIDE 20 MEQ
40 PACKET (EA) ORAL ONCE
Refills: 0 | Status: COMPLETED | OUTPATIENT
Start: 2022-07-20 | End: 2022-07-20

## 2022-07-20 RX ORDER — ACETAMINOPHEN 500 MG
650 TABLET ORAL EVERY 6 HOURS
Refills: 0 | Status: DISCONTINUED | OUTPATIENT
Start: 2022-07-20 | End: 2022-07-21

## 2022-07-20 RX ADMIN — PANTOPRAZOLE SODIUM 40 MILLIGRAM(S): 20 TABLET, DELAYED RELEASE ORAL at 12:14

## 2022-07-20 RX ADMIN — Medication 650 MILLIGRAM(S): at 23:54

## 2022-07-20 RX ADMIN — ENOXAPARIN SODIUM 40 MILLIGRAM(S): 100 INJECTION SUBCUTANEOUS at 05:38

## 2022-07-20 RX ADMIN — Medication 40 MILLIEQUIVALENT(S): at 12:14

## 2022-07-20 NOTE — DISCHARGE NOTE PROVIDER - NSDCCPCAREPLAN_GEN_ALL_CORE_FT
PRINCIPAL DISCHARGE DIAGNOSIS  Diagnosis: Acute abdomen  Assessment and Plan of Treatment: Recovering from surgery  WOUND CARE: Staples will be removed in the office with Dr. Pa.   BATHING: Please do not submerge wound underwater. Do not take a bath. You may shower and/or sponge bathe.  ACTIVITY: No heavy lifting or straining; do not lift anything greater than 10 pounds. Otherwise, you may return to your usual level of physical activity. If you are taking narcotic pain medication (such as Percocet), do NOT drive a car, operate machinery or make important decisions.  DIET: Return to your usual diet.  NOTIFY YOUR SURGEON IF: You have any bleeding that does not stop, any pus draining from your wound, any fever (over 100.4 F) or chills, persistent nausea/vomiting, persistent diarrhea, or if your pain is not controlled on your discharge pain medications.  FOLLOW-UP:  1. Please call to make a follow-up appointment within one week of discharge with Dr. Pa.   2. Please follow up with your primary care physician in one week regarding your hospitalization.

## 2022-07-20 NOTE — PROGRESS NOTE ADULT - ASSESSMENT
66 year male with history of daily ETOH use who presents with peritonitis and found to have moderate amount of intraabdominal free air concern for perforated viscus. s/p open ayanna patch repair of pre-pyloric perforated ulcer POD4. Recovering appropriately on floor.    Plan  - Diet: Advance diet today to full liquid diet  - Antibx: Zosyn, d/c after today   - Monitor ARIADNA output   - Pain control  - f/u H. pylori testing antibody  - Dispo: Home with PT services    ACS   [id number]     66 year male with history of daily ETOH use who presents with peritonitis and found to have moderate amount of intraabdominal free air concern for perforated viscus. s/p open ayanna patch repair of pre-pyloric perforated ulcer POD4. Recovering appropriately on floor.    Plan  - Diet: Advance diet today to Regular diet  - Antibx: Zosyn, d/c after today   - Monitor ARIADNA output   - Pain control  - f/u H. pylori testing antibody  - Dispo: Home with PT services, home tomorrow, ARIADNA drain to be removed prior to d/c    ACS   [phone]

## 2022-07-20 NOTE — DISCHARGE NOTE PROVIDER - HOSPITAL COURSE
66 year male with history of daily ETOH use who presents with peritonitis and found to have moderate amount of intraabdominal free air concern for perforated viscus.  Patient taken to the OR for an exploratory laparotomy and ayanna patch. Nasogastric tube placed at the end of the case. When the patient had return of bowel function the NGT was removed and the diet was advanced as tolerated. Physical therapy evaluated the patient and recommended PARAMJIT, however the patient is uninsured so PT progressed with the PT for DC home with home PT services. ARIADNA drain removed on day of discharge.   At the time of discharge, the patient was hemodynamically stable, tolerating PO diet, voiding urine, passing stool, ambulating and was comfortable with adequate pain control. The patient was instructed to follow up with Dr. Pa within 1-2 weeks after discharge. The patient felt comfortable with discharge. The patient was discharged to home. The patient had no other issues.

## 2022-07-20 NOTE — DISCHARGE NOTE PROVIDER - CARE PROVIDER_API CALL
Francisco Javier Pa)  Surgery; Surgical Critical Care  [address], [address]  [place] [address]  Phone: [phone]  Fax: [phone]  Follow Up Time: 1 week

## 2022-07-20 NOTE — DISCHARGE NOTE PROVIDER - NSDCCPTREATMENT_GEN_ALL_CORE_FT
PRINCIPAL PROCEDURE  Procedure: Exploratory laparotomy  Findings and Treatment:       SECONDARY PROCEDURE  Procedure: Repair of perforated pyloric ulcer using Chilo patch  Findings and Treatment:

## 2022-07-20 NOTE — PROGRESS NOTE ADULT - SUBJECTIVE AND OBJECTIVE BOX
SURGERY DAILY PROGRESS NOTE:     24h events: No acute events overnight.  PT eval performed; Home with [facility] services.    SUBJECTIVE/ROS: Patient feels well. Noted to be resting comfortably. He reports pain is well controlled and improving day by day. Reports tolerating clear liquid diet.   Denies nausea, vomiting, chest pain, shortness of breath.      MEDICATIONS  (STANDING):  enoxaparin Injectable 40 milliGRAM(s) SubCutaneous every 24 hours  nicotine -  14 mG/24Hr(s) Patch 1 Patch Transdermal daily  pantoprazole  Injectable 40 milliGRAM(s) IV Push daily    MEDICATIONS  (PRN):  diazepam  Injectable 20 milliGRAM(s) IV Push <User Schedule> PRN For CIWA > 8  HYDROmorphone  Injectable 0.5 milliGRAM(s) IV Push every 6 hours PRN Moderate Pain (4       OBJECTIVE:  Vital Signs Last 24 Hrs  T(C): 36.7 (20 Jul 2022 06:10), Max: 37 (19 Jul 2022 16:48)  T(F): 98 (20 Jul 2022 06:10), Max: 98.6 (19 Jul 2022 16:48)  HR: 68 (20 Jul 2022 06:10) (58 - 71)  BP: 118/79 (20 Jul 2022 06:10) (118/79 - 170/89)  BP(mean): --  RR: 18 (20 Jul 2022 06:10) (18 - 18)  SpO2: 94% (20 Jul 2022 06:10) (94% - 96%)    Parameters below as of 20 Jul 2022 06:10  Patient On (Oxygen Delivery Method): room air      I&O's Detail  19 Jul 2022 07:01  -  20 Jul 2022 07:00  --------------------------------------------------------  IN:    IV PiggyBack: 100 mL    Oral Fluid: 200 mL  Total IN: 300 mL    OUT:    Bulb (mL): 2 mL    Voided (mL): 825 mL  Total OUT: 827 mL  Total NET: -527 mL      LABS:                        12.9   7.77  )-----------( 173      ( 19 Jul 2022 07:42 )             37.6     07-19    138  |  105  |  8   ----------------------------<  121<H>  3.6   |  21<L>  |  1.01    Ca    8.3<L>      19 Jul 2022 07:31  Phos  2.7     07-19  Mg     2.0     07-19      PHYSICAL EXAM:  General: Appears well, NAD  Neuro: AAOx3  CHEST: Clear to auscultation bilaterally  Abdomen: soft, incisional tenderness, nondistended, no rebound or guarding, ARIADNA serosang drainage  Extremities: Grossly symmetric

## 2022-07-20 NOTE — DISCHARGE NOTE PROVIDER - CARE PROVIDERS DIRECT ADDRESSES
[email or web address]
Bladder non-tender and non-distended. Urine clear yellow.

## 2022-07-20 NOTE — DISCHARGE NOTE PROVIDER - NSDCMRMEDTOKEN_GEN_ALL_CORE_FT
acetaminophen 325 mg oral tablet: 2 tab(s) orally every 6 hours  oxyCODONE 5 mg oral capsule: 1 cap(s) orally every 4 hours MDD:4  pantoprazole 40 mg oral delayed release tablet: 1 tab(s) orally once a day   acetaminophen 325 mg oral tablet: 2 tab(s) orally every 6 hours  outpatient PT:   oxyCODONE 5 mg oral capsule: 1 cap(s) orally every 4 hours MDD:4  pantoprazole 40 mg oral delayed release tablet: 1 tab(s) orally once a day

## 2022-07-21 ENCOUNTER — TRANSCRIPTION ENCOUNTER (OUTPATIENT)
Age: 67
End: 2022-07-21

## 2022-07-21 VITALS
HEART RATE: 75 BPM | SYSTOLIC BLOOD PRESSURE: 121 MMHG | DIASTOLIC BLOOD PRESSURE: 74 MMHG | OXYGEN SATURATION: 94 % | RESPIRATION RATE: 18 BRPM | TEMPERATURE: 99 F

## 2022-07-21 LAB
ALBUMIN SERPL ELPH-MCNC: 3.7 G/DL — SIGNIFICANT CHANGE UP (ref 3.3–5)
ALP SERPL-CCNC: 70 U/L — SIGNIFICANT CHANGE UP (ref 40–120)
ALT FLD-CCNC: 47 U/L — HIGH (ref 10–45)
ANION GAP SERPL CALC-SCNC: 15 MMOL/L — SIGNIFICANT CHANGE UP (ref 5–17)
AST SERPL-CCNC: 42 U/L — HIGH (ref 10–40)
BILIRUB SERPL-MCNC: 0.6 MG/DL — SIGNIFICANT CHANGE UP (ref 0.2–1.2)
BUN SERPL-MCNC: 10 MG/DL — SIGNIFICANT CHANGE UP (ref 7–23)
C DIFF GDH STL QL: NEGATIVE — SIGNIFICANT CHANGE UP
C DIFF GDH STL QL: SIGNIFICANT CHANGE UP
CALCIUM SERPL-MCNC: 9.1 MG/DL — SIGNIFICANT CHANGE UP (ref 8.4–10.5)
CHLORIDE SERPL-SCNC: 106 MMOL/L — SIGNIFICANT CHANGE UP (ref 96–108)
CO2 SERPL-SCNC: 19 MMOL/L — LOW (ref 22–31)
CREAT SERPL-MCNC: 1.04 MG/DL — SIGNIFICANT CHANGE UP (ref 0.5–1.3)
EGFR: 80 ML/MIN/1.73M2 — SIGNIFICANT CHANGE UP
GLUCOSE SERPL-MCNC: 118 MG/DL — HIGH (ref 70–99)
HCT VFR BLD CALC: 42 % — SIGNIFICANT CHANGE UP (ref 39–50)
HGB BLD-MCNC: 14.4 G/DL — SIGNIFICANT CHANGE UP (ref 13–17)
MAGNESIUM SERPL-MCNC: 2.1 MG/DL — SIGNIFICANT CHANGE UP (ref 1.6–2.6)
MCHC RBC-ENTMCNC: 32.4 PG — SIGNIFICANT CHANGE UP (ref 27–34)
MCHC RBC-ENTMCNC: 34.3 GM/DL — SIGNIFICANT CHANGE UP (ref 32–36)
MCV RBC AUTO: 94.4 FL — SIGNIFICANT CHANGE UP (ref 80–100)
NRBC # BLD: 0 /100 WBCS — SIGNIFICANT CHANGE UP (ref 0–0)
PHOSPHATE SERPL-MCNC: 3.3 MG/DL — SIGNIFICANT CHANGE UP (ref 2.5–4.5)
PLATELET # BLD AUTO: 226 K/UL — SIGNIFICANT CHANGE UP (ref 150–400)
POTASSIUM SERPL-MCNC: 3.7 MMOL/L — SIGNIFICANT CHANGE UP (ref 3.5–5.3)
POTASSIUM SERPL-SCNC: 3.7 MMOL/L — SIGNIFICANT CHANGE UP (ref 3.5–5.3)
PROT SERPL-MCNC: 7 G/DL — SIGNIFICANT CHANGE UP (ref 6–8.3)
RBC # BLD: 4.45 M/UL — SIGNIFICANT CHANGE UP (ref 4.2–5.8)
RBC # FLD: 12.2 % — SIGNIFICANT CHANGE UP (ref 10.3–14.5)
SODIUM SERPL-SCNC: 140 MMOL/L — SIGNIFICANT CHANGE UP (ref 135–145)
WBC # BLD: 7.97 K/UL — SIGNIFICANT CHANGE UP (ref 3.8–10.5)
WBC # FLD AUTO: 7.97 K/UL — SIGNIFICANT CHANGE UP (ref 3.8–10.5)

## 2022-07-21 RX ORDER — OXYCODONE HYDROCHLORIDE 5 MG/1
1 TABLET ORAL
Qty: 8 | Refills: 0
Start: 2022-07-21

## 2022-07-21 RX ORDER — POTASSIUM CHLORIDE 20 MEQ
20 PACKET (EA) ORAL ONCE
Refills: 0 | Status: COMPLETED | OUTPATIENT
Start: 2022-07-21 | End: 2022-07-21

## 2022-07-21 RX ORDER — PANTOPRAZOLE SODIUM 20 MG/1
1 TABLET, DELAYED RELEASE ORAL
Qty: 30 | Refills: 0
Start: 2022-07-21 | End: 2022-08-19

## 2022-07-21 RX ORDER — ACETAMINOPHEN 500 MG
2 TABLET ORAL
Qty: 0 | Refills: 0 | DISCHARGE
Start: 2022-07-21

## 2022-07-21 RX ADMIN — Medication 20 MILLIEQUIVALENT(S): at 12:24

## 2022-07-21 RX ADMIN — ENOXAPARIN SODIUM 40 MILLIGRAM(S): 100 INJECTION SUBCUTANEOUS at 06:40

## 2022-07-21 RX ADMIN — Medication 650 MILLIGRAM(S): at 06:39

## 2022-07-21 RX ADMIN — Medication 650 MILLIGRAM(S): at 00:24

## 2022-07-21 RX ADMIN — PANTOPRAZOLE SODIUM 40 MILLIGRAM(S): 20 TABLET, DELAYED RELEASE ORAL at 12:24

## 2022-07-21 NOTE — PROGRESS NOTE ADULT - REASON FOR ADMISSION
Perforated viscus

## 2022-07-21 NOTE — DISCHARGE NOTE NURSING/CASE MANAGEMENT/SOCIAL WORK - NSDCPEFALRISK_GEN_ALL_CORE
For information on Fall & Injury Prevention, visit: [email or web address] OR  [email or web address] OR  https://www.cdc.gov/steadi/patient.html

## 2022-07-21 NOTE — PROGRESS NOTE ADULT - ASSESSMENT
66 year male with history of daily ETOH use who presents with peritonitis and found to have moderate amount of intraabdominal free air concern for perforated viscus. s/p open ayanna patch repair of pre-pyloric perforated ulcer on 7/16.  Recovering appropriately on floor.    Plan  - Diet: c/w regular diet   - Antibx: completed course on 7/20   - Monitor ARIADNA output   - Pain control  - f/u H. pylori testing antibody  - Dispo: Home with PT services, home tomorrow, ARIADNA drain to be removed prior to d/c    ACS   [id number]

## 2022-07-21 NOTE — DISCHARGE NOTE NURSING/CASE MANAGEMENT/SOCIAL WORK - PATIENT PORTAL LINK FT
You can access the FollowMyHealth Patient Portal offered by [facility] by registering at the following website: [email or web address]. By joining [employer]’s FollowMyHealth portal, you will also be able to view your health information using other applications (apps) compatible with our system.

## 2022-07-21 NOTE — PROGRESS NOTE ADULT - SUBJECTIVE AND OBJECTIVE BOX
SURGERY DAILY PROGRESS NOTE:       SUBJECTIVE/ROS: Patient seen and evaluated on AM rounds. Patient reports 7 episodes of diarrhea yesterday and overnight. Patient passing flatus. Tolerating a diet.   Denies nausea, vomiting, chest pain, shortness of breath       OBJECTIVE:    Vital Signs Last 24 Hrs  T(C): 36.4 (21 Jul 2022 05:40), Max: 37.3 (20 Jul 2022 08:41)  T(F): 97.5 (21 Jul 2022 05:40), Max: 99.2 (20 Jul 2022 08:41)  HR: 70 (21 Jul 2022 05:40) (62 - 72)  BP: 115/72 (21 Jul 2022 05:40) (109/72 - 128/78)  BP(mean): --  RR: 18 (21 Jul 2022 05:40) (18 - 18)  SpO2: 95% (21 Jul 2022 05:40) (94% - 95%)    Parameters below as of 21 Jul 2022 05:40  Patient On (Oxygen Delivery Method): room air      I&O's Detail    19 Jul 2022 07:01  -  20 Jul 2022 07:00  --------------------------------------------------------  IN:    IV PiggyBack: 100 mL    Oral Fluid: 200 mL  Total IN: 300 mL    OUT:    Bulb (mL): 2 mL    Voided (mL): 825 mL  Total OUT: 827 mL    Total NET: -527 mL      20 Jul 2022 07:01  -  21 Jul 2022 06:54  --------------------------------------------------------  IN:    Oral Fluid: 720 mL  Total IN: 720 mL    OUT:    Bulb (mL): 0 mL    Voided (mL): 650 mL  Total OUT: 650 mL    Total NET: 70 mL        Daily     Daily   MEDICATIONS  (STANDING):  acetaminophen     Tablet .. 650 milliGRAM(s) Oral every 6 hours  enoxaparin Injectable 40 milliGRAM(s) SubCutaneous every 24 hours  nicotine -  14 mG/24Hr(s) Patch 1 Patch Transdermal daily  pantoprazole    Tablet 40 milliGRAM(s) Oral daily  potassium chloride    Tablet ER 40 milliEquivalent(s) Oral once    MEDICATIONS  (PRN):  diazepam  Injectable 20 milliGRAM(s) IV Push <User Schedule> PRN For CIWA > 8  oxyCODONE    IR 5 milliGRAM(s) Oral every 4 hours PRN Moderate Pain (4 - 6)  oxyCODONE    IR 10 milliGRAM(s) Oral every 4 hours PRN Severe Pain (7 - 10)      LABS:                        13.5   5.91  )-----------( 210      ( 20 Jul 2022 07:13 )             39.0     07-20    138  |  104  |  10  ----------------------------<  103<H>  3.4<L>   |  19<L>  |  1.01    Ca    8.8      20 Jul 2022 07:12  Phos  3.0     07-20  Mg     1.9     07-20    TPro  6.5  /  Alb  3.4  /  TBili  0.7  /  DBili  x   /  AST  23  /  ALT  20  /  AlkPhos  64  07-20        PHYSICAL EXAM:  General: Appears well, NAD  Neuro: AAOx3  CHEST: non labored breathing on room air   Abdomen: soft, incisional tenderness, nondistended, no rebound or guarding, ARIADNA serosanguinous drainage, midline staples   Extremities: Grossly symmetric

## 2022-07-22 LAB — H PYLORI AB SER-ACNC: 92.1 UNITS — HIGH

## 2022-08-05 PROBLEM — Z78.9 OTHER SPECIFIED HEALTH STATUS: Chronic | Status: ACTIVE | Noted: 2022-07-15

## 2022-08-08 ENCOUNTER — APPOINTMENT (OUTPATIENT)
Dept: TRAUMA SURGERY | Facility: CLINIC | Age: 67
End: 2022-08-08

## 2022-08-08 PROBLEM — Z00.00 ENCOUNTER FOR PREVENTIVE HEALTH EXAMINATION: Status: ACTIVE | Noted: 2022-08-08

## 2022-08-08 NOTE — PLAN
[FreeTextEntry1] : Patient was not taking any anti-ulcer medications.  I strongly discussed that he should be taking a PPI & gave him a list of OTC PPI to purchase today.  I discussed that he should take PPIs until GI follow up when he should follow the advice of the GI team.\par \par I gave patient the number of the [facility] GI clinic for follow up care & likely EGD in the future.\par \par I advised the patient to call my office if any issues in getting appointment with GI or taking PPI medications\par \par Otherwise follow up as needed

## 2022-08-08 NOTE — PHYSICAL EXAM
[Abdominal Masses] : No abdominal masses [Abdomen Tenderness] : ~T ~M No abdominal tenderness [Alert] : alert [Calm] : calm [de-identified] : ambulating well in office [de-identified] : midline wound healing well.  No signs of infection or hernia

## 2022-08-08 NOTE — REVIEW OF SYSTEMS
[Fever] : no fever [Chills] : no chills [Feeling Poorly] : not feeling poorly [Feeling Tired] : not feeling tired [Abdominal Pain] : no abdominal pain [Vomiting] : no vomiting

## 2022-08-08 NOTE — HISTORY OF PRESENT ILLNESS
[de-identified] : 66 year old male s/p omental patch for perforated prepyloric ulcer on 7/16/22.  Now returns for follow up.  No complaints

## 2022-08-11 PROCEDURE — 86900 BLOOD TYPING SEROLOGIC ABO: CPT

## 2022-08-11 PROCEDURE — 99285 EMERGENCY DEPT VISIT HI MDM: CPT | Mod: 25

## 2022-08-11 PROCEDURE — 84295 ASSAY OF SERUM SODIUM: CPT

## 2022-08-11 PROCEDURE — 85610 PROTHROMBIN TIME: CPT

## 2022-08-11 PROCEDURE — 83605 ASSAY OF LACTIC ACID: CPT

## 2022-08-11 PROCEDURE — 86901 BLOOD TYPING SEROLOGIC RH(D): CPT

## 2022-08-11 PROCEDURE — 82330 ASSAY OF CALCIUM: CPT

## 2022-08-11 PROCEDURE — 85018 HEMOGLOBIN: CPT

## 2022-08-11 PROCEDURE — U0003: CPT

## 2022-08-11 PROCEDURE — 74177 CT ABD & PELVIS W/CONTRAST: CPT | Mod: MA

## 2022-08-11 PROCEDURE — 87324 CLOSTRIDIUM AG IA: CPT

## 2022-08-11 PROCEDURE — 93005 ELECTROCARDIOGRAM TRACING: CPT

## 2022-08-11 PROCEDURE — 97162 PT EVAL MOD COMPLEX 30 MIN: CPT

## 2022-08-11 PROCEDURE — 82435 ASSAY OF BLOOD CHLORIDE: CPT

## 2022-08-11 PROCEDURE — 82947 ASSAY GLUCOSE BLOOD QUANT: CPT

## 2022-08-11 PROCEDURE — 85027 COMPLETE CBC AUTOMATED: CPT

## 2022-08-11 PROCEDURE — 86677 HELICOBACTER PYLORI ANTIBODY: CPT

## 2022-08-11 PROCEDURE — 97530 THERAPEUTIC ACTIVITIES: CPT

## 2022-08-11 PROCEDURE — 87449 NOS EACH ORGANISM AG IA: CPT

## 2022-08-11 PROCEDURE — 84132 ASSAY OF SERUM POTASSIUM: CPT

## 2022-08-11 PROCEDURE — 85025 COMPLETE CBC W/AUTO DIFF WBC: CPT

## 2022-08-11 PROCEDURE — 83735 ASSAY OF MAGNESIUM: CPT

## 2022-08-11 PROCEDURE — 71045 X-RAY EXAM CHEST 1 VIEW: CPT

## 2022-08-11 PROCEDURE — 80053 COMPREHEN METABOLIC PANEL: CPT

## 2022-08-11 PROCEDURE — 83690 ASSAY OF LIPASE: CPT

## 2022-08-11 PROCEDURE — 82803 BLOOD GASES ANY COMBINATION: CPT

## 2022-08-11 PROCEDURE — 97116 GAIT TRAINING THERAPY: CPT

## 2022-08-11 PROCEDURE — C9399: CPT

## 2022-08-11 PROCEDURE — 86850 RBC ANTIBODY SCREEN: CPT

## 2022-08-11 PROCEDURE — 80076 HEPATIC FUNCTION PANEL: CPT

## 2022-08-11 PROCEDURE — 85014 HEMATOCRIT: CPT

## 2022-08-11 PROCEDURE — 84100 ASSAY OF PHOSPHORUS: CPT

## 2022-08-11 PROCEDURE — 96374 THER/PROPH/DIAG INJ IV PUSH: CPT

## 2022-08-11 PROCEDURE — 80048 BASIC METABOLIC PNL TOTAL CA: CPT

## 2022-08-11 PROCEDURE — 85730 THROMBOPLASTIN TIME PARTIAL: CPT

## 2022-08-11 PROCEDURE — 36415 COLL VENOUS BLD VENIPUNCTURE: CPT

## 2022-08-11 PROCEDURE — 82565 ASSAY OF CREATININE: CPT

## 2023-12-01 NOTE — PHYSICAL THERAPY INITIAL EVALUATION ADULT - BALANCE DISTURBANCE, IDENTIFIED IMPAIRMENT CONTRIBUTE, REHAB EVAL
Caller: Susy Hanson    Relationship: Self    Best call back number: [phone]    What is the best time to reach you: ANY    Who are you requesting to speak with (clinical staff, provider,  specific staff member): GILMAR    Do you know the name of the person who called:     What was the call regarding: PATIENT IS CALLING IN NEEDING AN UPDATE ON THE ENTRESTO PROGRAM PAPERWORK,. PATIENT STATES SHE HAS 30 DAYS LEFT TO APPLY. PATIENT ALSO STATED THE THE ENTRESTO PROGRAM HAS NOT RECEIVED ANY PAPERWORK YET.     Is it okay if the provider responds through [facility]: PLEASE CALL.         pain/decreased strength
